# Patient Record
Sex: FEMALE | Race: WHITE | NOT HISPANIC OR LATINO
[De-identification: names, ages, dates, MRNs, and addresses within clinical notes are randomized per-mention and may not be internally consistent; named-entity substitution may affect disease eponyms.]

---

## 2017-04-26 ENCOUNTER — MYAURORA ACCOUNT LINK (OUTPATIENT)
Dept: OTHER | Age: 37
End: 2017-04-26

## 2017-04-26 ENCOUNTER — LAB SERVICES (OUTPATIENT)
Dept: OTHER | Age: 37
End: 2017-04-26

## 2017-04-26 ENCOUNTER — CHARTING TRANS (OUTPATIENT)
Dept: OTHER | Age: 37
End: 2017-04-26

## 2017-05-02 LAB — PAP WITH HIGH RISK HPV: NORMAL

## 2017-06-06 ENCOUNTER — LAB SERVICES (OUTPATIENT)
Dept: OTHER | Age: 37
End: 2017-06-06

## 2017-06-06 ENCOUNTER — MYAURORA ACCOUNT LINK (OUTPATIENT)
Dept: OTHER | Age: 37
End: 2017-06-06

## 2017-06-06 ENCOUNTER — CHARTING TRANS (OUTPATIENT)
Dept: OTHER | Age: 37
End: 2017-06-06

## 2017-06-09 LAB — SURGICAL PATHOLOGY: NORMAL

## 2018-11-03 VITALS
TEMPERATURE: 98.9 F | DIASTOLIC BLOOD PRESSURE: 84 MMHG | HEART RATE: 77 BPM | WEIGHT: 184.13 LBS | RESPIRATION RATE: 18 BRPM | SYSTOLIC BLOOD PRESSURE: 148 MMHG

## 2018-11-04 VITALS
SYSTOLIC BLOOD PRESSURE: 142 MMHG | HEIGHT: 64 IN | DIASTOLIC BLOOD PRESSURE: 97 MMHG | RESPIRATION RATE: 18 BRPM | BODY MASS INDEX: 31.99 KG/M2 | WEIGHT: 187.39 LBS | HEART RATE: 117 BPM | TEMPERATURE: 98.6 F

## 2018-12-14 RX ORDER — NORETHINDRONE ACETATE AND ETHINYL ESTRADIOL, ETHINYL ESTRADIOL AND FERROUS FUMARATE 1MG-10(24)
KIT ORAL
Qty: 28 TABLET | Refills: 0 | Status: SHIPPED | OUTPATIENT
Start: 2018-12-14 | End: 2019-01-25 | Stop reason: SDUPTHER

## 2019-01-25 RX ORDER — NORETHINDRONE ACETATE AND ETHINYL ESTRADIOL, ETHINYL ESTRADIOL AND FERROUS FUMARATE 1MG-10(24)
KIT ORAL
Qty: 28 TABLET | Refills: 0 | OUTPATIENT
Start: 2019-01-25

## 2019-01-29 PROBLEM — R03.0 ELEVATED BP WITHOUT DIAGNOSIS OF HYPERTENSION: Status: ACTIVE | Noted: 2017-04-26

## 2019-01-29 PROBLEM — Z30.41 ORAL CONTRACEPTIVE USE: Status: ACTIVE | Noted: 2017-04-26

## 2019-01-29 PROBLEM — N84.1 CERVICAL POLYP: Status: ACTIVE | Noted: 2017-06-06

## 2019-02-11 ENCOUNTER — OFFICE VISIT (OUTPATIENT)
Dept: INTERNAL MEDICINE | Age: 39
End: 2019-02-11

## 2019-02-11 VITALS
HEIGHT: 64 IN | SYSTOLIC BLOOD PRESSURE: 156 MMHG | BODY MASS INDEX: 34.18 KG/M2 | TEMPERATURE: 98.7 F | HEART RATE: 85 BPM | RESPIRATION RATE: 18 BRPM | DIASTOLIC BLOOD PRESSURE: 96 MMHG | WEIGHT: 200.18 LBS

## 2019-02-11 DIAGNOSIS — R87.610 ATYPICAL SQUAMOUS CELL CHANGES OF UNDETERMINED SIGNIFICANCE (ASCUS) ON CERVICAL CYTOLOGY WITH POSITIVE HIGH RISK HUMAN PAPILLOMA VIRUS (HPV): ICD-10-CM

## 2019-02-11 DIAGNOSIS — R03.0 ELEVATED BP WITHOUT DIAGNOSIS OF HYPERTENSION: ICD-10-CM

## 2019-02-11 DIAGNOSIS — R87.810 ATYPICAL SQUAMOUS CELL CHANGES OF UNDETERMINED SIGNIFICANCE (ASCUS) ON CERVICAL CYTOLOGY WITH POSITIVE HIGH RISK HUMAN PAPILLOMA VIRUS (HPV): ICD-10-CM

## 2019-02-11 DIAGNOSIS — Z01.419 ENCOUNTER FOR GYNECOLOGICAL EXAMINATION WITHOUT ABNORMAL FINDING: Primary | ICD-10-CM

## 2019-02-11 DIAGNOSIS — Z00.00 HEALTHCARE MAINTENANCE: ICD-10-CM

## 2019-02-11 DIAGNOSIS — Z13.220 LIPID SCREENING: ICD-10-CM

## 2019-02-11 DIAGNOSIS — Z30.41 ORAL CONTRACEPTIVE USE: ICD-10-CM

## 2019-02-11 PROCEDURE — 99395 PREV VISIT EST AGE 18-39: CPT | Performed by: FAMILY MEDICINE

## 2019-02-11 ASSESSMENT — PATIENT HEALTH QUESTIONNAIRE - PHQ9
2. FEELING DOWN, DEPRESSED OR HOPELESS: NOT AT ALL
1. LITTLE INTEREST OR PLEASURE IN DOING THINGS: NOT AT ALL
SUM OF ALL RESPONSES TO PHQ9 QUESTIONS 1 AND 2: 0
SUM OF ALL RESPONSES TO PHQ9 QUESTIONS 1 AND 2: 0

## 2019-02-14 LAB — HPV16+18+45 E6+E7MRNA CVX NAA+PROBE: NORMAL

## 2019-06-11 ENCOUNTER — APPOINTMENT (OUTPATIENT)
Dept: INTERNAL MEDICINE | Age: 39
End: 2019-06-11

## 2019-07-18 ENCOUNTER — LAB SERVICES (OUTPATIENT)
Dept: LAB | Age: 39
End: 2019-07-18

## 2019-07-18 DIAGNOSIS — Z00.00 HEALTHCARE MAINTENANCE: ICD-10-CM

## 2019-07-18 DIAGNOSIS — Z13.220 LIPID SCREENING: ICD-10-CM

## 2019-07-18 PROCEDURE — 80061 LIPID PANEL: CPT | Performed by: FAMILY MEDICINE

## 2019-07-18 PROCEDURE — 36415 COLL VENOUS BLD VENIPUNCTURE: CPT

## 2019-07-18 PROCEDURE — 80053 COMPREHEN METABOLIC PANEL: CPT | Performed by: FAMILY MEDICINE

## 2019-07-19 LAB
ALBUMIN SERPL-MCNC: 3.6 G/DL (ref 3.6–5.1)
ALBUMIN/GLOB SERPL: 1 {RATIO} (ref 1–2.4)
ALP SERPL-CCNC: 53 UNITS/L (ref 45–117)
ALT SERPL-CCNC: 33 UNITS/L
ANION GAP SERPL CALC-SCNC: 11 MMOL/L (ref 10–20)
AST SERPL-CCNC: 22 UNITS/L
BILIRUB SERPL-MCNC: 0.4 MG/DL (ref 0.2–1)
BUN SERPL-MCNC: 10 MG/DL (ref 6–20)
BUN/CREAT SERPL: 14 (ref 7–25)
CALCIUM SERPL-MCNC: 9.5 MG/DL (ref 8.4–10.2)
CHLORIDE SERPL-SCNC: 106 MMOL/L (ref 98–107)
CHOLEST SERPL-MCNC: 203 MG/DL
CHOLEST/HDLC SERPL: 2.9 {RATIO}
CO2 SERPL-SCNC: 28 MMOL/L (ref 21–32)
CREAT SERPL-MCNC: 0.71 MG/DL (ref 0.51–0.95)
FASTING STATUS PATIENT QL REPORTED: NORMAL HRS
GLOBULIN SER-MCNC: 3.7 G/DL (ref 2–4)
GLUCOSE SERPL-MCNC: 94 MG/DL (ref 65–99)
HDLC SERPL-MCNC: 71 MG/DL
LDLC SERPL-MCNC: 106 MG/DL
LENGTH OF FAST TIME PATIENT: ABNORMAL HRS
NONHDLC SERPL-MCNC: 132 MG/DL
POTASSIUM SERPL-SCNC: 4.1 MMOL/L (ref 3.4–5.1)
PROT SERPL-MCNC: 7.3 G/DL (ref 6.4–8.2)
SODIUM SERPL-SCNC: 141 MMOL/L (ref 135–145)
TRIGL SERPL-MCNC: 132 MG/DL

## 2019-07-23 ENCOUNTER — OFFICE VISIT (OUTPATIENT)
Dept: INTERNAL MEDICINE | Age: 39
End: 2019-07-23

## 2019-07-23 VITALS
SYSTOLIC BLOOD PRESSURE: 153 MMHG | WEIGHT: 201.94 LBS | BODY MASS INDEX: 34.66 KG/M2 | DIASTOLIC BLOOD PRESSURE: 89 MMHG | TEMPERATURE: 99.9 F | RESPIRATION RATE: 18 BRPM | HEART RATE: 89 BPM

## 2019-07-23 DIAGNOSIS — R03.0 ELEVATED BP WITHOUT DIAGNOSIS OF HYPERTENSION: Primary | ICD-10-CM

## 2019-07-23 DIAGNOSIS — Z30.41 ORAL CONTRACEPTIVE USE: ICD-10-CM

## 2019-07-23 DIAGNOSIS — Z71.2 ENCOUNTER TO DISCUSS TEST RESULTS: ICD-10-CM

## 2019-07-23 DIAGNOSIS — E66.9 OBESITY (BMI 30-39.9): ICD-10-CM

## 2019-07-23 PROCEDURE — 99213 OFFICE O/P EST LOW 20 MIN: CPT | Performed by: FAMILY MEDICINE

## 2019-07-23 ASSESSMENT — ENCOUNTER SYMPTOMS
CONSTITUTIONAL NEGATIVE: 1
NUMBNESS: 0
HEADACHES: 0
WEAKNESS: 0
LIGHT-HEADEDNESS: 0

## 2019-07-23 ASSESSMENT — PATIENT HEALTH QUESTIONNAIRE - PHQ9
SUM OF ALL RESPONSES TO PHQ9 QUESTIONS 1 AND 2: 0
SUM OF ALL RESPONSES TO PHQ9 QUESTIONS 1 AND 2: 0
1. LITTLE INTEREST OR PLEASURE IN DOING THINGS: NOT AT ALL
2. FEELING DOWN, DEPRESSED OR HOPELESS: NOT AT ALL

## 2020-01-22 DIAGNOSIS — Z30.41 ORAL CONTRACEPTIVE USE: ICD-10-CM

## 2020-01-22 RX ORDER — NORETHINDRONE ACETATE AND ETHINYL ESTRADIOL, ETHINYL ESTRADIOL AND FERROUS FUMARATE 1MG-10(24)
KIT ORAL
Qty: 84 TABLET | Refills: 3 | Status: SHIPPED | OUTPATIENT
Start: 2020-01-22 | End: 2021-01-03

## 2021-01-02 DIAGNOSIS — Z30.41 ORAL CONTRACEPTIVE USE: ICD-10-CM

## 2021-01-03 RX ORDER — NORETHINDRONE ACETATE AND ETHINYL ESTRADIOL, ETHINYL ESTRADIOL AND FERROUS FUMARATE 1MG-10(24)
KIT ORAL
Qty: 84 TABLET | Refills: 3 | Status: SHIPPED | OUTPATIENT
Start: 2021-01-03 | End: 2021-12-02

## 2021-06-22 ENCOUNTER — LAB SERVICES (OUTPATIENT)
Dept: LAB | Age: 41
End: 2021-06-22

## 2021-06-22 ENCOUNTER — OFFICE VISIT (OUTPATIENT)
Dept: INTERNAL MEDICINE | Age: 41
End: 2021-06-22

## 2021-06-22 VITALS
WEIGHT: 200 LBS | BODY MASS INDEX: 34.15 KG/M2 | HEIGHT: 64 IN | TEMPERATURE: 97.7 F | SYSTOLIC BLOOD PRESSURE: 128 MMHG | HEART RATE: 67 BPM | DIASTOLIC BLOOD PRESSURE: 77 MMHG | RESPIRATION RATE: 18 BRPM

## 2021-06-22 DIAGNOSIS — Z11.59 NEED FOR HEPATITIS C SCREENING TEST: ICD-10-CM

## 2021-06-22 DIAGNOSIS — R03.0 ELEVATED BP WITHOUT DIAGNOSIS OF HYPERTENSION: ICD-10-CM

## 2021-06-22 DIAGNOSIS — R92.2 DENSE BREASTS: ICD-10-CM

## 2021-06-22 DIAGNOSIS — Z00.00 HEALTHCARE MAINTENANCE: Primary | ICD-10-CM

## 2021-06-22 DIAGNOSIS — Z00.00 HEALTHCARE MAINTENANCE: ICD-10-CM

## 2021-06-22 DIAGNOSIS — Z12.31 ENCOUNTER FOR SCREENING MAMMOGRAM FOR MALIGNANT NEOPLASM OF BREAST: ICD-10-CM

## 2021-06-22 DIAGNOSIS — R92.30 DENSE BREASTS: ICD-10-CM

## 2021-06-22 LAB
ALBUMIN SERPL-MCNC: 3.7 G/DL (ref 3.6–5.1)
ALBUMIN/GLOB SERPL: 0.9 {RATIO} (ref 1–2.4)
ALP SERPL-CCNC: 55 UNITS/L (ref 45–117)
ALT SERPL-CCNC: 31 UNITS/L
ANION GAP SERPL CALC-SCNC: 12 MMOL/L (ref 10–20)
AST SERPL-CCNC: 28 UNITS/L
BILIRUB SERPL-MCNC: 0.5 MG/DL (ref 0.2–1)
BUN SERPL-MCNC: 9 MG/DL (ref 6–20)
BUN/CREAT SERPL: 12 (ref 7–25)
CALCIUM SERPL-MCNC: 9.3 MG/DL (ref 8.4–10.2)
CHLORIDE SERPL-SCNC: 106 MMOL/L (ref 98–107)
CHOLEST SERPL-MCNC: 206 MG/DL
CHOLEST/HDLC SERPL: 3.3 {RATIO}
CO2 SERPL-SCNC: 25 MMOL/L (ref 21–32)
CREAT SERPL-MCNC: 0.74 MG/DL (ref 0.51–0.95)
FASTING DURATION TIME PATIENT: 11 HOURS
FASTING DURATION TIME PATIENT: ABNORMAL H
GFR SERPLBLD BASED ON 1.73 SQ M-ARVRAT: >90 ML/MIN/1.73M2
GLOBULIN SER-MCNC: 3.9 G/DL (ref 2–4)
GLUCOSE SERPL-MCNC: 105 MG/DL (ref 65–99)
HCV AB SER QL: NEGATIVE
HDLC SERPL-MCNC: 63 MG/DL
LDLC SERPL CALC-MCNC: 106 MG/DL
NONHDLC SERPL-MCNC: 143 MG/DL
POTASSIUM SERPL-SCNC: 4.2 MMOL/L (ref 3.4–5.1)
PROT SERPL-MCNC: 7.6 G/DL (ref 6.4–8.2)
SODIUM SERPL-SCNC: 139 MMOL/L (ref 135–145)
TRIGL SERPL-MCNC: 185 MG/DL

## 2021-06-22 PROCEDURE — 80061 LIPID PANEL: CPT | Performed by: FAMILY MEDICINE

## 2021-06-22 PROCEDURE — 86803 HEPATITIS C AB TEST: CPT | Performed by: FAMILY MEDICINE

## 2021-06-22 PROCEDURE — 80053 COMPREHEN METABOLIC PANEL: CPT | Performed by: FAMILY MEDICINE

## 2021-06-22 PROCEDURE — 99396 PREV VISIT EST AGE 40-64: CPT | Performed by: FAMILY MEDICINE

## 2021-06-22 ASSESSMENT — ENCOUNTER SYMPTOMS
GASTROINTESTINAL NEGATIVE: 1
COUGH: 0
DIZZINESS: 0
NUMBNESS: 0
CONSTITUTIONAL NEGATIVE: 1
SHORTNESS OF BREATH: 0
SLEEP DISTURBANCE: 0
WEAKNESS: 0
CHEST TIGHTNESS: 0
LIGHT-HEADEDNESS: 0
HEADACHES: 0

## 2021-06-22 ASSESSMENT — PATIENT HEALTH QUESTIONNAIRE - PHQ9
CLINICAL INTERPRETATION OF PHQ9 SCORE: NO FURTHER SCREENING NEEDED
1. LITTLE INTEREST OR PLEASURE IN DOING THINGS: NOT AT ALL
SUM OF ALL RESPONSES TO PHQ9 QUESTIONS 1 AND 2: 0
SUM OF ALL RESPONSES TO PHQ9 QUESTIONS 1 AND 2: 0
CLINICAL INTERPRETATION OF PHQ2 SCORE: NO FURTHER SCREENING NEEDED
2. FEELING DOWN, DEPRESSED OR HOPELESS: NOT AT ALL

## 2021-06-22 ASSESSMENT — PAIN SCALES - GENERAL: PAINLEVEL: 0

## 2021-07-09 ENCOUNTER — HOSPITAL ENCOUNTER (OUTPATIENT)
Dept: MAMMOGRAPHY | Age: 41
Discharge: HOME | End: 2021-07-09
Attending: FAMILY MEDICINE

## 2021-07-09 DIAGNOSIS — R92.2 DENSE BREASTS: ICD-10-CM

## 2021-07-09 DIAGNOSIS — Z12.31 ENCOUNTER FOR SCREENING MAMMOGRAM FOR MALIGNANT NEOPLASM OF BREAST: ICD-10-CM

## 2021-07-09 DIAGNOSIS — R92.30 DENSE BREASTS: ICD-10-CM

## 2021-07-09 PROCEDURE — 77063 BREAST TOMOSYNTHESIS BI: CPT

## 2021-07-09 PROCEDURE — 76641 ULTRASOUND BREAST COMPLETE: CPT

## 2021-09-14 ENCOUNTER — E-ADVICE (OUTPATIENT)
Dept: INTERNAL MEDICINE | Age: 41
End: 2021-09-14

## 2021-12-02 DIAGNOSIS — Z30.41 ORAL CONTRACEPTIVE USE: ICD-10-CM

## 2021-12-02 RX ORDER — NORETHINDRONE ACETATE AND ETHINYL ESTRADIOL, ETHINYL ESTRADIOL AND FERROUS FUMARATE 1MG-10(24)
KIT ORAL
Qty: 84 TABLET | Refills: 3 | Status: SHIPPED | OUTPATIENT
Start: 2021-12-02 | End: 2022-09-01 | Stop reason: ALTCHOICE

## 2022-06-06 ENCOUNTER — APPOINTMENT (OUTPATIENT)
Age: 42
Setting detail: DERMATOLOGY
End: 2022-06-08

## 2022-06-06 ENCOUNTER — EXTERNAL RECORD (OUTPATIENT)
Dept: HEALTH INFORMATION MANAGEMENT | Facility: OTHER | Age: 42
End: 2022-06-06

## 2022-06-06 DIAGNOSIS — L81.4 OTHER MELANIN HYPERPIGMENTATION: ICD-10-CM

## 2022-06-06 DIAGNOSIS — D18.0 HEMANGIOMA: ICD-10-CM

## 2022-06-06 DIAGNOSIS — F42.4 EXCORIATION (SKIN-PICKING) DISORDER: ICD-10-CM

## 2022-06-06 DIAGNOSIS — Z12.83 ENCOUNTER FOR SCREENING FOR MALIGNANT NEOPLASM OF SKIN: ICD-10-CM

## 2022-06-06 DIAGNOSIS — D22 MELANOCYTIC NEVI: ICD-10-CM

## 2022-06-06 PROBLEM — D18.01 HEMANGIOMA OF SKIN AND SUBCUTANEOUS TISSUE: Status: ACTIVE | Noted: 2022-06-06

## 2022-06-06 PROBLEM — S20.102A UNSPECIFIED SUPERFICIAL INJURIES OF BREAST, LEFT BREAST, INITIAL ENCOUNTER: Status: ACTIVE | Noted: 2022-06-06

## 2022-06-06 PROBLEM — D22.5 MELANOCYTIC NEVI OF TRUNK: Status: ACTIVE | Noted: 2022-06-06

## 2022-06-06 PROBLEM — D23.72 OTHER BENIGN NEOPLASM OF SKIN OF LEFT LOWER LIMB, INCLUDING HIP: Status: ACTIVE | Noted: 2022-06-06

## 2022-06-06 PROCEDURE — OTHER COUNSELING: OTHER

## 2022-06-06 PROCEDURE — 99203 OFFICE O/P NEW LOW 30 MIN: CPT

## 2022-06-06 PROCEDURE — OTHER ADDITIONAL NOTES: OTHER

## 2022-06-06 PROCEDURE — OTHER EDUCATIONAL RESOURCES PROVIDED: OTHER

## 2022-06-06 ASSESSMENT — LOCATION DETAILED DESCRIPTION DERM
LOCATION DETAILED: LEFT SUPERIOR LATERAL UPPER BACK
LOCATION DETAILED: RIGHT ANTERIOR SHOULDER
LOCATION DETAILED: MIDDLE STERNUM
LOCATION DETAILED: SUPERIOR THORACIC SPINE
LOCATION DETAILED: EPIGASTRIC SKIN
LOCATION DETAILED: LEFT LATERAL SUPERIOR CHEST
LOCATION DETAILED: LEFT LATERAL BREAST 1-2:00 REGION
LOCATION DETAILED: RIGHT SUPERIOR LATERAL UPPER BACK
LOCATION DETAILED: INFERIOR THORACIC SPINE

## 2022-06-06 ASSESSMENT — LOCATION SIMPLE DESCRIPTION DERM
LOCATION SIMPLE: RIGHT SHOULDER
LOCATION SIMPLE: RIGHT BACK
LOCATION SIMPLE: LEFT BREAST
LOCATION SIMPLE: UPPER BACK
LOCATION SIMPLE: ABDOMEN
LOCATION SIMPLE: CHEST
LOCATION SIMPLE: LEFT UPPER BACK

## 2022-06-06 ASSESSMENT — LOCATION ZONE DERM
LOCATION ZONE: ARM
LOCATION ZONE: TRUNK

## 2022-06-06 NOTE — PROCEDURE: ADDITIONAL NOTES
Additional Notes: Patient was counseled on electrodessication cosmetic treatment, charge $200 for 15+ for a procedural appointment.
Detail Level: Simple
Render Risk Assessment In Note?: no
Additional Notes: Patient was counseled on keeping these covered with hydrocolloid bandages to prevent picking.\\nPatient was counseled if flaring persists after one month, would need to followup for re-evaluation and potential biopsy.

## 2022-07-11 ENCOUNTER — E-ADVICE (OUTPATIENT)
Dept: INTERNAL MEDICINE | Age: 42
End: 2022-07-11

## 2022-07-11 DIAGNOSIS — Z12.31 ENCOUNTER FOR SCREENING MAMMOGRAM FOR MALIGNANT NEOPLASM OF BREAST: Primary | ICD-10-CM

## 2022-07-27 ENCOUNTER — HOSPITAL ENCOUNTER (OUTPATIENT)
Dept: MAMMOGRAPHY | Age: 42
Discharge: HOME | End: 2022-07-27
Attending: FAMILY MEDICINE

## 2022-07-27 DIAGNOSIS — Z12.31 ENCOUNTER FOR SCREENING MAMMOGRAM FOR MALIGNANT NEOPLASM OF BREAST: ICD-10-CM

## 2022-07-27 PROCEDURE — 77063 BREAST TOMOSYNTHESIS BI: CPT

## 2022-09-01 ENCOUNTER — LAB SERVICES (OUTPATIENT)
Dept: LAB | Age: 42
End: 2022-09-01

## 2022-09-01 ENCOUNTER — OFFICE VISIT (OUTPATIENT)
Dept: INTERNAL MEDICINE | Age: 42
End: 2022-09-01

## 2022-09-01 VITALS
BODY MASS INDEX: 35.53 KG/M2 | HEIGHT: 64 IN | SYSTOLIC BLOOD PRESSURE: 152 MMHG | OXYGEN SATURATION: 98 % | DIASTOLIC BLOOD PRESSURE: 89 MMHG | RESPIRATION RATE: 16 BRPM | HEART RATE: 72 BPM | TEMPERATURE: 98.1 F | WEIGHT: 208.1 LBS

## 2022-09-01 DIAGNOSIS — E66.9 OBESITY (BMI 30-39.9): ICD-10-CM

## 2022-09-01 DIAGNOSIS — Z00.00 HEALTHCARE MAINTENANCE: Primary | ICD-10-CM

## 2022-09-01 DIAGNOSIS — Z71.3 ENCOUNTER FOR WEIGHT LOSS COUNSELING: ICD-10-CM

## 2022-09-01 DIAGNOSIS — R03.0 ELEVATED BP WITHOUT DIAGNOSIS OF HYPERTENSION: ICD-10-CM

## 2022-09-01 LAB
ALBUMIN SERPL-MCNC: 3.6 G/DL (ref 3.6–5.1)
ALBUMIN/GLOB SERPL: 0.9 {RATIO} (ref 1–2.4)
ALP SERPL-CCNC: 75 UNITS/L (ref 45–117)
ALT SERPL-CCNC: 25 UNITS/L
ANION GAP SERPL CALC-SCNC: 12 MMOL/L (ref 7–19)
AST SERPL-CCNC: 17 UNITS/L
BILIRUB SERPL-MCNC: 0.5 MG/DL (ref 0.2–1)
BUN SERPL-MCNC: 6 MG/DL (ref 6–20)
BUN/CREAT SERPL: 9 (ref 7–25)
CALCIUM SERPL-MCNC: 9 MG/DL (ref 8.4–10.2)
CHLORIDE SERPL-SCNC: 105 MMOL/L (ref 97–110)
CHOLEST SERPL-MCNC: 182 MG/DL
CHOLEST/HDLC SERPL: 3.1 {RATIO}
CO2 SERPL-SCNC: 27 MMOL/L (ref 21–32)
CREAT SERPL-MCNC: 0.68 MG/DL (ref 0.51–0.95)
FASTING DURATION TIME PATIENT: ABNORMAL H
FASTING DURATION TIME PATIENT: NORMAL H
GFR SERPLBLD BASED ON 1.73 SQ M-ARVRAT: >90 ML/MIN
GLOBULIN SER-MCNC: 3.8 G/DL (ref 2–4)
GLUCOSE SERPL-MCNC: 97 MG/DL (ref 70–99)
HDLC SERPL-MCNC: 59 MG/DL
LDLC SERPL CALC-MCNC: 105 MG/DL
NONHDLC SERPL-MCNC: 123 MG/DL
POTASSIUM SERPL-SCNC: 4.3 MMOL/L (ref 3.4–5.1)
PROT SERPL-MCNC: 7.4 G/DL (ref 6.4–8.2)
SODIUM SERPL-SCNC: 140 MMOL/L (ref 135–145)
TRIGL SERPL-MCNC: 91 MG/DL
TSH SERPL-ACNC: 1.15 MCUNITS/ML (ref 0.35–5)

## 2022-09-01 PROCEDURE — 99396 PREV VISIT EST AGE 40-64: CPT | Performed by: FAMILY MEDICINE

## 2022-09-01 PROCEDURE — 80053 COMPREHEN METABOLIC PANEL: CPT | Performed by: INTERNAL MEDICINE

## 2022-09-01 PROCEDURE — 84443 ASSAY THYROID STIM HORMONE: CPT | Performed by: INTERNAL MEDICINE

## 2022-09-01 PROCEDURE — 80061 LIPID PANEL: CPT | Performed by: INTERNAL MEDICINE

## 2022-09-01 RX ORDER — COVID-19 MOLECULAR TEST ASSAY
KIT MISCELLANEOUS
COMMUNITY
Start: 2022-06-12 | End: 2023-07-27 | Stop reason: ALTCHOICE

## 2022-09-01 ASSESSMENT — PATIENT HEALTH QUESTIONNAIRE - PHQ9
2. FEELING DOWN, DEPRESSED OR HOPELESS: NOT AT ALL
SUM OF ALL RESPONSES TO PHQ9 QUESTIONS 1 AND 2: 0
CLINICAL INTERPRETATION OF PHQ2 SCORE: NO FURTHER SCREENING NEEDED
SUM OF ALL RESPONSES TO PHQ9 QUESTIONS 1 AND 2: 0
1. LITTLE INTEREST OR PLEASURE IN DOING THINGS: NOT AT ALL

## 2022-09-01 ASSESSMENT — ENCOUNTER SYMPTOMS
COUGH: 0
WEAKNESS: 0
SHORTNESS OF BREATH: 0
CHEST TIGHTNESS: 0
CONSTITUTIONAL NEGATIVE: 1
DIZZINESS: 0
HEADACHES: 0
LIGHT-HEADEDNESS: 0
NUMBNESS: 0
GASTROINTESTINAL NEGATIVE: 1
SLEEP DISTURBANCE: 0

## 2022-09-01 ASSESSMENT — PAIN SCALES - GENERAL: PAINLEVEL: 0

## 2022-10-11 ENCOUNTER — OFFICE VISIT (OUTPATIENT)
Dept: NUTRITION | Age: 42
End: 2022-10-11

## 2022-10-11 DIAGNOSIS — E66.9 OBESITY (BMI 30-39.9): Primary | ICD-10-CM

## 2022-10-11 PROCEDURE — 97802 MEDICAL NUTRITION INDIV IN: CPT

## 2022-10-12 DIAGNOSIS — E66.9 OBESITY (BMI 30-39.9): Primary | ICD-10-CM

## 2022-10-12 DIAGNOSIS — Z71.3 DIETARY COUNSELING: ICD-10-CM

## 2022-11-04 DIAGNOSIS — Z30.41 ORAL CONTRACEPTIVE USE: ICD-10-CM

## 2022-11-07 RX ORDER — NORETHINDRONE ACETATE AND ETHINYL ESTRADIOL, ETHINYL ESTRADIOL AND FERROUS FUMARATE 1MG-10(24)
KIT ORAL
Qty: 84 TABLET | Refills: 3 | OUTPATIENT
Start: 2022-11-07

## 2022-12-05 ENCOUNTER — EXTERNAL LAB (OUTPATIENT)
Dept: OTHER | Age: 42
End: 2022-12-05

## 2022-12-05 LAB
ABO + RH BLD: NORMAL
BLD GP AB SCN SERPL QL: NEGATIVE
C TRACH RRNA SPEC QL NAA+PROBE: NEGATIVE
HBV SURFACE AG SER QL: NEGATIVE
HCV AB SER QL: <0.1 S/CO RATIO (ref 0–0.9)
HIV 1+2 AB+HIV1 P24 AG SERPL QL IA: NON REACTIVE
LAB RESULT: NORMAL
N GONORRHOEA RRNA SPEC QL NAA+PROBE: NEGATIVE
RPR SER QL: NON REACTIVE
RUBV IGG SERPL IA-ACNC: 1.68 INDEX

## 2022-12-13 ENCOUNTER — APPOINTMENT (OUTPATIENT)
Dept: NUTRITION | Age: 42
End: 2022-12-13

## 2022-12-22 ENCOUNTER — OFFICE VISIT (OUTPATIENT)
Dept: NUTRITION | Age: 42
End: 2022-12-22
Attending: FAMILY MEDICINE

## 2022-12-22 DIAGNOSIS — E66.9 OBESITY (BMI 35.0-39.9 WITHOUT COMORBIDITY): Primary | ICD-10-CM

## 2022-12-22 PROCEDURE — 97803 MED NUTRITION INDIV SUBSEQ: CPT

## 2023-02-09 ENCOUNTER — APPOINTMENT (OUTPATIENT)
Dept: NUTRITION | Age: 43
End: 2023-02-09

## 2023-04-29 ENCOUNTER — EXTERNAL LAB (OUTPATIENT)
Dept: OTHER | Age: 43
End: 2023-04-29

## 2023-04-29 LAB
25(OH)D3+25(OH)D2 SERPL-MCNC: 37.3 NG/ML (ref 30–100)
ABSOLUTE IMMATURE GRANULOCYTES (OFFPRE24): 0 X10E3/UL (ref 0–0.1)
BASOPHILS # BLD: 0.1 X10E3/UL (ref 0–0.2)
BASOPHILS NFR BLD: 1 %
EOSINOPHIL # BLD: 0.1 X10E3/UL (ref 0–0.4)
EOSINOPHIL NFR BLD: 2 %
ERYTHROCYTE [DISTWIDTH] IN BLOOD: 12.8 % (ref 11.7–15.4)
HCT VFR BLD CALC: 40.4 % (ref 34–46.6)
HGB BLD-MCNC: 13.2 G/DL (ref 11.1–15.9)
IMMATURE GRANULOCYTES (OFFPRE25): 0 %
LYMPHOCYTES # BLD: 1.8 X10E3/UL (ref 0.7–3.1)
LYMPHOCYTES NFR BLD: 22 %
MCH RBC QN AUTO: 29.9 PG (ref 26.6–33)
MCHC RBC AUTO-ENTMCNC: 32.7 G/DL (ref 31.5–35.7)
MCV RBC AUTO: 91 FL (ref 79–97)
MONOCYTES # BLD: 0.8 X10E3/UL (ref 0.1–0.9)
MONOCYTES NFR BLD: 10 %
NEUTROPHILS # BLD: 5.3 X10E3/UL (ref 1.4–7)
NEUTROPHILS NFR BLD: 65 %
PLATELET # BLD: 202 X10E3/UL (ref 150–450)
RBC # BLD: 4.42 X10E6/UL (ref 3.77–5.28)
T4 FREE SERPL-MCNC: 1.16 NG/DL (ref 0.82–1.77)
TSH SERPL-ACNC: 1.86 UIU/ML (ref 0.45–4.5)
WBC # BLD: 8.1 X10E3/UL (ref 3.4–10.8)

## 2023-06-05 ENCOUNTER — EXTERNAL LAB (OUTPATIENT)
Dept: OTHER | Age: 43
End: 2023-06-05

## 2023-06-05 LAB — LAB RESULT: NORMAL

## 2023-06-14 ENCOUNTER — EXTERNAL RECORD (OUTPATIENT)
Dept: HEALTH INFORMATION MANAGEMENT | Facility: OTHER | Age: 43
End: 2023-06-14

## 2023-06-15 ENCOUNTER — EXTERNAL RECORD (OUTPATIENT)
Dept: HEALTH INFORMATION MANAGEMENT | Facility: OTHER | Age: 43
End: 2023-06-15

## 2023-06-20 ENCOUNTER — TELEPHONE (OUTPATIENT)
Dept: OBGYN | Age: 43
End: 2023-06-20

## 2023-06-20 ENCOUNTER — APPOINTMENT (OUTPATIENT)
Dept: URBAN - METROPOLITAN AREA CLINIC 314 | Age: 43
Setting detail: DERMATOLOGY
End: 2023-07-06

## 2023-06-20 DIAGNOSIS — D18.0 HEMANGIOMA: ICD-10-CM

## 2023-06-20 DIAGNOSIS — L81.4 OTHER MELANIN HYPERPIGMENTATION: ICD-10-CM

## 2023-06-20 DIAGNOSIS — Z12.83 ENCOUNTER FOR SCREENING FOR MALIGNANT NEOPLASM OF SKIN: ICD-10-CM

## 2023-06-20 DIAGNOSIS — D22 MELANOCYTIC NEVI: ICD-10-CM

## 2023-06-20 PROBLEM — D22.5 MELANOCYTIC NEVI OF TRUNK: Status: ACTIVE | Noted: 2023-06-20

## 2023-06-20 PROBLEM — D23.72 OTHER BENIGN NEOPLASM OF SKIN OF LEFT LOWER LIMB, INCLUDING HIP: Status: ACTIVE | Noted: 2023-06-20

## 2023-06-20 PROBLEM — D18.01 HEMANGIOMA OF SKIN AND SUBCUTANEOUS TISSUE: Status: ACTIVE | Noted: 2023-06-20

## 2023-06-20 PROCEDURE — 99213 OFFICE O/P EST LOW 20 MIN: CPT

## 2023-06-20 PROCEDURE — OTHER ADDITIONAL NOTES: OTHER

## 2023-06-20 PROCEDURE — OTHER COUNSELING: OTHER

## 2023-06-20 ASSESSMENT — LOCATION DETAILED DESCRIPTION DERM
LOCATION DETAILED: EPIGASTRIC SKIN
LOCATION DETAILED: RIGHT ANTERIOR SHOULDER
LOCATION DETAILED: SUPERIOR THORACIC SPINE
LOCATION DETAILED: LEFT LATERAL SUPERIOR CHEST
LOCATION DETAILED: LEFT SUPERIOR LATERAL UPPER BACK
LOCATION DETAILED: INFERIOR THORACIC SPINE
LOCATION DETAILED: RIGHT SUPERIOR LATERAL UPPER BACK
LOCATION DETAILED: MIDDLE STERNUM

## 2023-06-20 ASSESSMENT — LOCATION SIMPLE DESCRIPTION DERM
LOCATION SIMPLE: LEFT UPPER BACK
LOCATION SIMPLE: RIGHT BACK
LOCATION SIMPLE: RIGHT SHOULDER
LOCATION SIMPLE: ABDOMEN
LOCATION SIMPLE: CHEST
LOCATION SIMPLE: UPPER BACK

## 2023-06-20 ASSESSMENT — LOCATION ZONE DERM
LOCATION ZONE: TRUNK
LOCATION ZONE: ARM

## 2023-06-20 NOTE — PROCEDURE: ADDITIONAL NOTES
Detail Level: Simple
Render Risk Assessment In Note?: no
Additional Notes: Patient was counseled on electrodessication cosmetic treatment, charge $200 for 15+ for a procedural appointment.

## 2023-06-22 ENCOUNTER — TELEPHONE (OUTPATIENT)
Dept: OBGYN | Age: 43
End: 2023-06-22

## 2023-06-22 ENCOUNTER — TELEPHONIC VISIT (OUTPATIENT)
Dept: OBGYN | Age: 43
End: 2023-06-22

## 2023-06-22 DIAGNOSIS — Z34.90 ENCOUNTER FOR SUPERVISION OF NORMAL PREGNANCY, ANTEPARTUM, UNSPECIFIED GRAVIDITY: Primary | ICD-10-CM

## 2023-06-22 PROCEDURE — X1094 NO CHARGE VISIT: HCPCS | Performed by: OBSTETRICS & GYNECOLOGY

## 2023-06-23 ENCOUNTER — APPOINTMENT (OUTPATIENT)
Dept: OBGYN | Age: 43
End: 2023-06-23

## 2023-06-29 ENCOUNTER — HOSPITAL ENCOUNTER (OUTPATIENT)
Dept: LAB | Age: 43
Discharge: HOME | End: 2023-06-29

## 2023-06-29 ENCOUNTER — FIRST OB VISIT (OUTPATIENT)
Dept: OBGYN | Age: 43
End: 2023-06-29

## 2023-06-29 VITALS
BODY MASS INDEX: 34.66 KG/M2 | DIASTOLIC BLOOD PRESSURE: 83 MMHG | WEIGHT: 203 LBS | TEMPERATURE: 100 F | SYSTOLIC BLOOD PRESSURE: 135 MMHG | RESPIRATION RATE: 16 BRPM | HEART RATE: 87 BPM | HEIGHT: 64 IN

## 2023-06-29 DIAGNOSIS — Z01.419 GYNECOLOGIC EXAM NORMAL: ICD-10-CM

## 2023-06-29 DIAGNOSIS — O09.511 PRIMIGRAVIDA OF ADVANCED MATERNAL AGE IN FIRST TRIMESTER: ICD-10-CM

## 2023-06-29 DIAGNOSIS — Z34.90 ENCOUNTER FOR SUPERVISION OF NORMAL PREGNANCY, ANTEPARTUM, UNSPECIFIED GRAVIDITY: ICD-10-CM

## 2023-06-29 DIAGNOSIS — Z32.01 POSITIVE PREGNANCY TEST: Primary | ICD-10-CM

## 2023-06-29 LAB
B-HCG UR QL: POSITIVE
BASOPHILS # BLD: 0.1 K/MCL (ref 0–0.3)
BASOPHILS NFR BLD: 1 %
DEPRECATED RDW RBC: 44.6 FL (ref 39–50)
EOSINOPHIL # BLD: 0.1 K/MCL (ref 0–0.5)
EOSINOPHIL NFR BLD: 1 %
ERYTHROCYTE [DISTWIDTH] IN BLOOD: 13.6 % (ref 11–15)
HCT VFR BLD CALC: 39 % (ref 36–46.5)
HGB BLD-MCNC: 12.9 G/DL (ref 12–15.5)
IMM GRANULOCYTES # BLD AUTO: 0 K/MCL (ref 0–0.2)
IMM GRANULOCYTES # BLD: 0 %
INTERNAL PROCEDURAL CONTROLS ACCEPTABLE: YES
LYMPHOCYTES # BLD: 2.6 K/MCL (ref 1–4.8)
LYMPHOCYTES NFR BLD: 21 %
MCH RBC QN AUTO: 30 PG (ref 26–34)
MCHC RBC AUTO-ENTMCNC: 33.1 G/DL (ref 32–36.5)
MCV RBC AUTO: 90.7 FL (ref 78–100)
MONOCYTES # BLD: 0.7 K/MCL (ref 0.3–0.9)
MONOCYTES NFR BLD: 5 %
NEUTROPHILS # BLD: 9.1 K/MCL (ref 1.8–7.7)
NEUTROPHILS NFR BLD: 72 %
NRBC BLD MANUAL-RTO: 0 /100 WBC
PLATELET # BLD AUTO: 229 K/MCL (ref 140–450)
RBC # BLD: 4.3 MIL/MCL (ref 4–5.2)
TEST LOT EXPIRATION DATE: ABNORMAL
TEST LOT NUMBER: ABNORMAL
WBC # BLD: 12.4 K/MCL (ref 4.2–11)

## 2023-06-29 PROCEDURE — 88175 CYTOPATH C/V AUTO FLUID REDO: CPT | Performed by: INTERNAL MEDICINE

## 2023-06-29 PROCEDURE — 86803 HEPATITIS C AB TEST: CPT | Performed by: OBSTETRICS & GYNECOLOGY

## 2023-06-29 PROCEDURE — 87389 HIV-1 AG W/HIV-1&-2 AB AG IA: CPT | Performed by: OBSTETRICS & GYNECOLOGY

## 2023-06-29 PROCEDURE — 86762 RUBELLA ANTIBODY: CPT | Performed by: OBSTETRICS & GYNECOLOGY

## 2023-06-29 PROCEDURE — 36415 COLL VENOUS BLD VENIPUNCTURE: CPT | Performed by: OBSTETRICS & GYNECOLOGY

## 2023-06-29 PROCEDURE — 81025 URINE PREGNANCY TEST: CPT | Performed by: OBSTETRICS & GYNECOLOGY

## 2023-06-29 PROCEDURE — 86850 RBC ANTIBODY SCREEN: CPT | Performed by: OBSTETRICS & GYNECOLOGY

## 2023-06-29 PROCEDURE — 85025 COMPLETE CBC W/AUTO DIFF WBC: CPT | Performed by: OBSTETRICS & GYNECOLOGY

## 2023-06-29 PROCEDURE — 87624 HPV HI-RISK TYP POOLED RSLT: CPT | Performed by: INTERNAL MEDICINE

## 2023-06-29 PROCEDURE — 86787 VARICELLA-ZOSTER ANTIBODY: CPT | Performed by: OBSTETRICS & GYNECOLOGY

## 2023-06-29 PROCEDURE — 76815 OB US LIMITED FETUS(S): CPT | Performed by: OBSTETRICS & GYNECOLOGY

## 2023-06-29 PROCEDURE — 86592 SYPHILIS TEST NON-TREP QUAL: CPT | Performed by: OBSTETRICS & GYNECOLOGY

## 2023-06-29 PROCEDURE — 87340 HEPATITIS B SURFACE AG IA: CPT | Performed by: OBSTETRICS & GYNECOLOGY

## 2023-06-29 PROCEDURE — 0500F INITIAL PRENATAL CARE VISIT: CPT | Performed by: OBSTETRICS & GYNECOLOGY

## 2023-06-29 PROCEDURE — 87086 URINE CULTURE/COLONY COUNT: CPT | Performed by: INTERNAL MEDICINE

## 2023-06-29 PROCEDURE — 87491 CHLMYD TRACH DNA AMP PROBE: CPT | Performed by: INTERNAL MEDICINE

## 2023-06-29 PROCEDURE — 87591 N.GONORRHOEAE DNA AMP PROB: CPT | Performed by: INTERNAL MEDICINE

## 2023-06-29 ASSESSMENT — EDINBURGH POSTNATAL DEPRESSION SCALE (EPDS)
TOTAL SCORE: 4
THINGS HAVE BEEN GETTING ON TOP OF ME: NO, MOST OF THE TIME I HAVE COPED QUITE WELL
I HAVE BLAMED MYSELF UNNECESSARILY WHEN THINGS WENT WRONG: NOT VERY OFTEN
I HAVE BEEN ANXIOUS OR WORRIED FOR NO GOOD REASON: YES, SOMETIMES
I HAVE BEEN ABLE TO LAUGH AND SEE THE FUNNY SIDE OF THINGS: AS MUCH AS I ALWAYS COULD
I HAVE BEEN SO UNHAPPY THAT I HAVE BEEN CRYING: NO, NEVER
THE THOUGHT OF HARMING MYSELF HAS OCCURRED TO ME: NEVER
I HAVE BEEN SO UNHAPPY THAT I HAVE HAD DIFFICULTY SLEEPING: NOT AT ALL
I HAVE LOOKED FORWARD WITH ENJOYMENT TO THINGS: AS MUCH AS I EVER DID
I HAVE FELT SCARED OR PANICKY FOR NO GOOD REASON: NO, NOT AT ALL
I HAVE FELT SAD OR MISERABLE: NO, NOT AT ALL

## 2023-06-29 ASSESSMENT — LIFESTYLE VARIABLES
BEFORE YOU WERE PREGNANT DID YOU HAVE A PROBLEM WITH ALCOHOL OR DRUG USE? (PAST): NO
DO ANY OF YOUR FRIENDS (PEERS) HAVE PROBLEMS WITH ALCOHOL OR DRUG USE?: NO
DOES YOUR PARTNER HAVE A PROBLEM WITH ALCOHOL OR DRUG USE?: NO
IN THE PAST MONTH, DID YOU DRINK BEER, WINE OR LIQUOR, OR USE OTHER DRUGS? (PREGNANCY): NO
DID ANY OF YOUR PARENTS HAVE PROBLEMS WITH ALCOHOL OR DRUG USE?: NO

## 2023-06-30 LAB
ABO + RH BLD: NORMAL
APPEARANCE UR: CLEAR
BACTERIA UR CULT: NORMAL
BILIRUB UR QL STRIP: NEGATIVE
BLD GP AB SCN SERPL QL GEL: NEGATIVE
C TRACH RRNA UR QL NAA+PROBE: NEGATIVE
COLOR UR: NORMAL
GLUCOSE UR STRIP-MCNC: NEGATIVE MG/DL
HBV SURFACE AG SER QL: NEGATIVE
HCV AB SER QL: NEGATIVE
HGB UR QL STRIP: NEGATIVE
HIV 1+2 AB+HIV1 P24 AG SERPL QL IA: NONREACTIVE
KETONES UR STRIP-MCNC: NEGATIVE MG/DL
LEUKOCYTE ESTERASE UR QL STRIP: NEGATIVE
Lab: NORMAL
N GONORRHOEA RRNA UR QL NAA+PROBE: NEGATIVE
NITRITE UR QL STRIP: NEGATIVE
PH UR STRIP: 6 [PH] (ref 5–7)
PROT UR STRIP-MCNC: NEGATIVE MG/DL
RPR SER QL: NONREACTIVE
RUBV IGG SERPL IA-ACNC: 62.9 UNITS/ML
SP GR UR STRIP: 1.01 (ref 1–1.03)
UROBILINOGEN UR STRIP-MCNC: 0.2 MG/DL
VZV IGG SER IA-ACNC: 2754 INDEX
VZV IGG SER QL IA: NORMAL

## 2023-07-05 LAB
CASE RPRT: NORMAL
CLINICAL INFO: NORMAL
CYTOLOGY CVX/VAG STUDY: NORMAL
HPV16+18+45 E6+E7MRNA CVX NAA+PROBE: NEGATIVE
Lab: NORMAL
PAP EDUCATIONAL NOTE: NORMAL
SPECIMEN ADEQUACY: NORMAL

## 2023-07-06 ENCOUNTER — CLINICAL ABSTRACT (OUTPATIENT)
Dept: HEALTH INFORMATION MANAGEMENT | Facility: OTHER | Age: 43
End: 2023-07-06

## 2023-07-18 ENCOUNTER — OFFICE VISIT (OUTPATIENT)
Dept: MATERNAL FETAL MEDICINE | Age: 43
End: 2023-07-18
Attending: OBSTETRICS & GYNECOLOGY

## 2023-07-18 ENCOUNTER — LAB SERVICES (OUTPATIENT)
Dept: LAB | Age: 43
End: 2023-07-18

## 2023-07-18 ENCOUNTER — EXTERNAL LAB (OUTPATIENT)
Dept: MATERNAL FETAL MEDICINE | Age: 43
End: 2023-07-18

## 2023-07-18 DIAGNOSIS — O09.511 PRIMIGRAVIDA OF ADVANCED MATERNAL AGE IN FIRST TRIMESTER: Primary | ICD-10-CM

## 2023-07-18 DIAGNOSIS — E66.9 OBESITY (BMI 30-39.9): ICD-10-CM

## 2023-07-18 LAB
BKR KIT TESTING DISCLAIMER STATEMENT: NORMAL
PRENATAL SCREENING (INVITAE) SUMMARY: NEGATIVE

## 2023-07-18 PROCEDURE — 76801 OB US < 14 WKS SINGLE FETUS: CPT | Performed by: OBSTETRICS & GYNECOLOGY

## 2023-07-18 PROCEDURE — 36415 COLL VENOUS BLD VENIPUNCTURE: CPT | Performed by: INTERNAL MEDICINE

## 2023-07-18 PROCEDURE — 96040 HB GENETIC COUNSELING EACH 30 MIN: CPT | Performed by: GENETIC COUNSELOR, MS

## 2023-07-20 ENCOUNTER — OFFICE VISIT (OUTPATIENT)
Dept: MATERNAL FETAL MEDICINE | Age: 43
End: 2023-07-20
Attending: OBSTETRICS & GYNECOLOGY

## 2023-07-20 ENCOUNTER — TELEPHONE (OUTPATIENT)
Dept: OBGYN | Age: 43
End: 2023-07-20

## 2023-07-20 VITALS
WEIGHT: 202.71 LBS | BODY MASS INDEX: 34.61 KG/M2 | HEIGHT: 64 IN | SYSTOLIC BLOOD PRESSURE: 133 MMHG | HEART RATE: 76 BPM | RESPIRATION RATE: 16 BRPM | DIASTOLIC BLOOD PRESSURE: 69 MMHG

## 2023-07-20 DIAGNOSIS — O09.511 PRIMIGRAVIDA OF ADVANCED MATERNAL AGE IN FIRST TRIMESTER: Primary | ICD-10-CM

## 2023-07-20 DIAGNOSIS — R87.610 ATYPICAL SQUAMOUS CELL CHANGES OF UNDETERMINED SIGNIFICANCE (ASCUS) ON CERVICAL CYTOLOGY WITH POSITIVE HIGH RISK HUMAN PAPILLOMA VIRUS (HPV): ICD-10-CM

## 2023-07-20 DIAGNOSIS — R03.0 ELEVATED BP WITHOUT DIAGNOSIS OF HYPERTENSION: ICD-10-CM

## 2023-07-20 DIAGNOSIS — R87.810 ATYPICAL SQUAMOUS CELL CHANGES OF UNDETERMINED SIGNIFICANCE (ASCUS) ON CERVICAL CYTOLOGY WITH POSITIVE HIGH RISK HUMAN PAPILLOMA VIRUS (HPV): ICD-10-CM

## 2023-07-20 DIAGNOSIS — Z36.89 ENCOUNTER FOR FETAL ANATOMIC SURVEY: Primary | ICD-10-CM

## 2023-07-20 DIAGNOSIS — Z87.42 HISTORY OF FEMALE INFERTILITY: ICD-10-CM

## 2023-07-20 DIAGNOSIS — E66.9 OBESITY (BMI 30-39.9): ICD-10-CM

## 2023-07-20 PROCEDURE — 99211 OFF/OP EST MAY X REQ PHY/QHP: CPT | Performed by: OBSTETRICS & GYNECOLOGY

## 2023-07-20 PROCEDURE — 99203 OFFICE O/P NEW LOW 30 MIN: CPT | Performed by: OBSTETRICS & GYNECOLOGY

## 2023-07-20 ASSESSMENT — PAIN SCALES - GENERAL: PAINLEVEL: 0

## 2023-07-25 ENCOUNTER — TELEPHONE (OUTPATIENT)
Dept: MATERNAL FETAL MEDICINE | Age: 43
End: 2023-07-25

## 2023-07-27 ENCOUNTER — OB CHECK (OUTPATIENT)
Dept: OBGYN | Age: 43
End: 2023-07-27

## 2023-07-27 VITALS
SYSTOLIC BLOOD PRESSURE: 133 MMHG | TEMPERATURE: 97.6 F | BODY MASS INDEX: 34.57 KG/M2 | DIASTOLIC BLOOD PRESSURE: 84 MMHG | HEART RATE: 73 BPM | WEIGHT: 202.5 LBS | HEIGHT: 64 IN

## 2023-07-27 DIAGNOSIS — O09.511 PRIMIGRAVIDA OF ADVANCED MATERNAL AGE IN FIRST TRIMESTER: ICD-10-CM

## 2023-07-27 DIAGNOSIS — Z34.90 ENCOUNTER FOR SUPERVISION OF NORMAL PREGNANCY, ANTEPARTUM, UNSPECIFIED GRAVIDITY: Primary | ICD-10-CM

## 2023-07-27 LAB
APPEARANCE, POC: CLEAR
BILIRUBIN, POC: NEGATIVE
COLOR, POC: YELLOW
GLUCOSE UR-MCNC: NEGATIVE MG/DL
KETONES, POC: NEGATIVE MG/DL
NITRITE, POC: NEGATIVE
OCCULT BLOOD, POC: NEGATIVE
PH UR: 6.5 [PH] (ref 5–7)
PROT UR-MCNC: NEGATIVE MG/DL
SP GR UR: 1 (ref 1–1.03)
UROBILINOGEN UR-MCNC: 0.2 MG/DL (ref 0–1)
WBC (LEUKOCYTE) ESTERASE, POC: NEGATIVE

## 2023-07-27 PROCEDURE — 0502F SUBSEQUENT PRENATAL CARE: CPT | Performed by: OBSTETRICS & GYNECOLOGY

## 2023-07-27 RX ORDER — ASPIRIN 81 MG/1
81 TABLET, CHEWABLE ORAL DAILY
COMMUNITY

## 2023-07-27 ASSESSMENT — PAIN SCALES - GENERAL: PAINLEVEL: 0

## 2023-08-24 ENCOUNTER — HOSPITAL ENCOUNTER (OUTPATIENT)
Dept: LAB | Age: 43
Discharge: HOME | End: 2023-08-24

## 2023-08-24 ENCOUNTER — OB CHECK (OUTPATIENT)
Dept: OBGYN | Age: 43
End: 2023-08-24

## 2023-08-24 VITALS
HEIGHT: 64 IN | BODY MASS INDEX: 34.93 KG/M2 | DIASTOLIC BLOOD PRESSURE: 77 MMHG | TEMPERATURE: 98.8 F | HEART RATE: 81 BPM | WEIGHT: 204.59 LBS | SYSTOLIC BLOOD PRESSURE: 121 MMHG

## 2023-08-24 DIAGNOSIS — Z34.90 ENCOUNTER FOR SUPERVISION OF NORMAL PREGNANCY, ANTEPARTUM, UNSPECIFIED GRAVIDITY: Primary | ICD-10-CM

## 2023-08-24 DIAGNOSIS — Z34.90 ENCOUNTER FOR SUPERVISION OF NORMAL PREGNANCY, ANTEPARTUM, UNSPECIFIED GRAVIDITY: ICD-10-CM

## 2023-08-24 LAB
APPEARANCE, POC: CLEAR
BILIRUBIN, POC: NEGATIVE
COLOR, POC: YELLOW
GLUCOSE UR-MCNC: NEGATIVE MG/DL
KETONES, POC: NEGATIVE MG/DL
NITRITE, POC: NEGATIVE
OCCULT BLOOD, POC: ABNORMAL
PH UR: 5.5 [PH] (ref 5–7)
PROT UR-MCNC: NEGATIVE MG/DL
SP GR UR: 1.02 (ref 1–1.03)
UROBILINOGEN UR-MCNC: 0.2 MG/DL (ref 0–1)
WBC (LEUKOCYTE) ESTERASE, POC: NEGATIVE

## 2023-08-24 PROCEDURE — 0502F SUBSEQUENT PRENATAL CARE: CPT | Performed by: OBSTETRICS & GYNECOLOGY

## 2023-08-24 PROCEDURE — 82105 ALPHA-FETOPROTEIN SERUM: CPT | Performed by: OBSTETRICS & GYNECOLOGY

## 2023-08-24 PROCEDURE — 36415 COLL VENOUS BLD VENIPUNCTURE: CPT | Performed by: OBSTETRICS & GYNECOLOGY

## 2023-08-24 ASSESSMENT — PAIN SCALES - GENERAL: PAINLEVEL: 0

## 2023-08-25 LAB
# FETUSES US: NORMAL
AFP MOM SERPL: 2.16 MOM
AFP SERPL-MCNC: 69.5 NG/ML
AGE AT DELIVERY: 43.31
GA: NORMAL WK
HX OF TRISOMY 21 NARR: NO
IDDM PATIENT QL: NO
METHOD BEST OVERALL GA ESTIMATE: NORMAL
NEURAL TUBE DEFECT RISK FETUS: NORMAL %
SERVICE CMNT-IMP: NORMAL
SERVICE CMNT-IMP: NORMAL
TS 21 RISK FETUS: NORMAL

## 2023-09-12 DIAGNOSIS — Z36.89 ENCOUNTER FOR FETAL ANATOMIC SURVEY: ICD-10-CM

## 2023-09-13 ENCOUNTER — OFFICE VISIT (OUTPATIENT)
Dept: MATERNAL FETAL MEDICINE | Age: 43
End: 2023-09-13
Attending: OBSTETRICS & GYNECOLOGY

## 2023-09-13 DIAGNOSIS — R03.0 ELEVATED BP WITHOUT DIAGNOSIS OF HYPERTENSION: ICD-10-CM

## 2023-09-13 DIAGNOSIS — O09.512 PRIMIGRAVIDA OF ADVANCED MATERNAL AGE IN SECOND TRIMESTER: Primary | ICD-10-CM

## 2023-09-13 DIAGNOSIS — E66.9 OBESITY (BMI 30-39.9): ICD-10-CM

## 2023-09-13 PROCEDURE — 76811 OB US DETAILED SNGL FETUS: CPT | Performed by: OBSTETRICS & GYNECOLOGY

## 2023-09-13 PROCEDURE — 76817 TRANSVAGINAL US OBSTETRIC: CPT | Performed by: OBSTETRICS & GYNECOLOGY

## 2023-09-15 ENCOUNTER — TELEPHONE (OUTPATIENT)
Dept: OBGYN | Age: 43
End: 2023-09-15

## 2023-09-15 DIAGNOSIS — Z3A.24 24 WEEKS GESTATION OF PREGNANCY: ICD-10-CM

## 2023-09-15 DIAGNOSIS — Z3A.22 22 WEEKS GESTATION OF PREGNANCY: Primary | ICD-10-CM

## 2023-09-19 ENCOUNTER — TELEPHONE (OUTPATIENT)
Dept: MATERNAL FETAL MEDICINE | Age: 43
End: 2023-09-19

## 2023-09-21 ENCOUNTER — OB CHECK (OUTPATIENT)
Dept: OBGYN | Age: 43
End: 2023-09-21

## 2023-09-21 ENCOUNTER — APPOINTMENT (OUTPATIENT)
Dept: OBGYN | Age: 43
End: 2023-09-21

## 2023-09-21 VITALS
HEIGHT: 64 IN | BODY MASS INDEX: 35.83 KG/M2 | WEIGHT: 209.9 LBS | DIASTOLIC BLOOD PRESSURE: 83 MMHG | TEMPERATURE: 97.7 F | SYSTOLIC BLOOD PRESSURE: 123 MMHG | HEART RATE: 87 BPM

## 2023-09-21 DIAGNOSIS — Z34.90 PREGNANCY, UNSPECIFIED GESTATIONAL AGE: Primary | ICD-10-CM

## 2023-09-21 DIAGNOSIS — Z23 NEED FOR INFLUENZA VACCINATION: ICD-10-CM

## 2023-09-21 LAB
APPEARANCE, POC: CLEAR
BILIRUBIN, POC: NEGATIVE
COLOR, POC: YELLOW
GLUCOSE UR-MCNC: ABNORMAL MG/DL
KETONES, POC: NEGATIVE MG/DL
NITRITE, POC: NEGATIVE
OCCULT BLOOD, POC: NEGATIVE
PH UR: 5 [PH] (ref 5–7)
PROT UR-MCNC: NEGATIVE MG/DL
SP GR UR: 1 (ref 1–1.03)
UROBILINOGEN UR-MCNC: 0.2 MG/DL (ref 0–1)
WBC (LEUKOCYTE) ESTERASE, POC: NEGATIVE

## 2023-09-21 PROCEDURE — 0502F SUBSEQUENT PRENATAL CARE: CPT | Performed by: OBSTETRICS & GYNECOLOGY

## 2023-09-21 PROCEDURE — 90471 IMMUNIZATION ADMIN: CPT | Performed by: OBSTETRICS & GYNECOLOGY

## 2023-09-21 PROCEDURE — 90686 IIV4 VACC NO PRSV 0.5 ML IM: CPT | Performed by: OBSTETRICS & GYNECOLOGY

## 2023-09-21 ASSESSMENT — PAIN SCALES - GENERAL: PAINLEVEL: 0

## 2023-09-26 DIAGNOSIS — Z3A.24 24 WEEKS GESTATION OF PREGNANCY: ICD-10-CM

## 2023-09-27 ENCOUNTER — OFFICE VISIT (OUTPATIENT)
Dept: MATERNAL FETAL MEDICINE | Age: 43
End: 2023-09-27
Attending: OBSTETRICS & GYNECOLOGY

## 2023-09-27 DIAGNOSIS — Z36.2 ENCOUNTER FOR FOLLOW-UP ULTRASOUND OF FETAL ANATOMY: Primary | ICD-10-CM

## 2023-09-27 PROCEDURE — 76816 OB US FOLLOW-UP PER FETUS: CPT | Performed by: OBSTETRICS & GYNECOLOGY

## 2023-09-29 ENCOUNTER — E-ADVICE (OUTPATIENT)
Dept: OBGYN | Age: 43
End: 2023-09-29

## 2023-10-11 ENCOUNTER — TELEPHONIC VISIT (OUTPATIENT)
Dept: MATERNAL FETAL MEDICINE | Age: 43
End: 2023-10-11
Attending: OBSTETRICS & GYNECOLOGY

## 2023-10-11 DIAGNOSIS — O09.519 ADVANCED MATERNAL AGE, PRIMIGRAVIDA, ANTEPARTUM: Primary | ICD-10-CM

## 2023-10-11 PROCEDURE — 76816 OB US FOLLOW-UP PER FETUS: CPT | Performed by: OBSTETRICS & GYNECOLOGY

## 2023-10-18 ENCOUNTER — OB CHECK (OUTPATIENT)
Dept: OBGYN | Age: 43
End: 2023-10-18

## 2023-10-18 VITALS
DIASTOLIC BLOOD PRESSURE: 77 MMHG | BODY MASS INDEX: 35.32 KG/M2 | HEIGHT: 64 IN | WEIGHT: 206.9 LBS | OXYGEN SATURATION: 97 % | HEART RATE: 74 BPM | SYSTOLIC BLOOD PRESSURE: 119 MMHG | TEMPERATURE: 97.3 F | RESPIRATION RATE: 17 BRPM

## 2023-10-18 DIAGNOSIS — O09.511 PRIMIGRAVIDA OF ADVANCED MATERNAL AGE IN FIRST TRIMESTER: Primary | ICD-10-CM

## 2023-10-18 LAB
APPEARANCE, POC: CLEAR
BILIRUBIN, POC: NEGATIVE
COLOR, POC: YELLOW
GLUCOSE UR-MCNC: NEGATIVE MG/DL
KETONES, POC: NEGATIVE MG/DL
NITRITE, POC: NEGATIVE
OCCULT BLOOD, POC: NEGATIVE
PH UR: 7 [PH] (ref 5–7)
PROT UR-MCNC: NEGATIVE MG/DL
SP GR UR: 1.02 (ref 1–1.03)
UROBILINOGEN UR-MCNC: 0.2 MG/DL (ref 0–1)
WBC (LEUKOCYTE) ESTERASE, POC: NEGATIVE

## 2023-10-18 PROCEDURE — 0502F SUBSEQUENT PRENATAL CARE: CPT | Performed by: OBSTETRICS & GYNECOLOGY

## 2023-10-18 ASSESSMENT — PAIN SCALES - GENERAL: PAINLEVEL: 0

## 2023-10-26 DIAGNOSIS — O09.511 PRIMIGRAVIDA OF ADVANCED MATERNAL AGE IN FIRST TRIMESTER: ICD-10-CM

## 2023-11-08 ENCOUNTER — HOSPITAL ENCOUNTER (OUTPATIENT)
Dept: LAB | Age: 43
Discharge: HOME | End: 2023-11-08
Attending: OBSTETRICS & GYNECOLOGY

## 2023-11-08 ENCOUNTER — OFFICE VISIT (OUTPATIENT)
Dept: MATERNAL FETAL MEDICINE | Age: 43
End: 2023-11-08
Attending: OBSTETRICS & GYNECOLOGY

## 2023-11-08 DIAGNOSIS — O09.511 PRIMIGRAVIDA OF ADVANCED MATERNAL AGE IN FIRST TRIMESTER: ICD-10-CM

## 2023-11-08 DIAGNOSIS — O09.513 PRIMIGRAVIDA OF ADVANCED MATERNAL AGE IN THIRD TRIMESTER: Primary | ICD-10-CM

## 2023-11-08 LAB
BASOPHILS # BLD: 0.1 K/MCL (ref 0–0.3)
BASOPHILS NFR BLD: 0 %
DEPRECATED RDW RBC: 48.9 FL (ref 39–50)
EOSINOPHIL # BLD: 0.1 K/MCL (ref 0–0.5)
EOSINOPHIL NFR BLD: 1 %
ERYTHROCYTE [DISTWIDTH] IN BLOOD: 13.8 % (ref 11–15)
GLUCOSE 1H P 50 G GLC PO SERPL-MCNC: 117 MG/DL (ref 65–139)
HCT VFR BLD CALC: 35.8 % (ref 36–46.5)
HGB BLD-MCNC: 11.8 G/DL (ref 12–15.5)
HIV 1+2 AB+HIV1 P24 AG SERPL QL IA: NONREACTIVE
IMM GRANULOCYTES # BLD AUTO: 0.1 K/MCL (ref 0–0.2)
IMM GRANULOCYTES # BLD: 1 %
LYMPHOCYTES # BLD: 2 K/MCL (ref 1–4.8)
LYMPHOCYTES NFR BLD: 15 %
MCH RBC QN AUTO: 31.7 PG (ref 26–34)
MCHC RBC AUTO-ENTMCNC: 33 G/DL (ref 32–36.5)
MCV RBC AUTO: 96.2 FL (ref 78–100)
MONOCYTES # BLD: 0.7 K/MCL (ref 0.3–0.9)
MONOCYTES NFR BLD: 5 %
NEUTROPHILS # BLD: 10.6 K/MCL (ref 1.8–7.7)
NEUTROPHILS NFR BLD: 78 %
NRBC BLD MANUAL-RTO: 0 /100 WBC
PLATELET # BLD AUTO: 204 K/MCL (ref 140–450)
RBC # BLD: 3.72 MIL/MCL (ref 4–5.2)
WBC # BLD: 13.5 K/MCL (ref 4.2–11)

## 2023-11-08 PROCEDURE — 36415 COLL VENOUS BLD VENIPUNCTURE: CPT | Performed by: OBSTETRICS & GYNECOLOGY

## 2023-11-08 PROCEDURE — 82950 GLUCOSE TEST: CPT | Performed by: OBSTETRICS & GYNECOLOGY

## 2023-11-08 PROCEDURE — 76816 OB US FOLLOW-UP PER FETUS: CPT | Performed by: OBSTETRICS & GYNECOLOGY

## 2023-11-08 PROCEDURE — 85025 COMPLETE CBC W/AUTO DIFF WBC: CPT | Performed by: OBSTETRICS & GYNECOLOGY

## 2023-11-08 PROCEDURE — 86592 SYPHILIS TEST NON-TREP QUAL: CPT | Performed by: OBSTETRICS & GYNECOLOGY

## 2023-11-08 PROCEDURE — 87389 HIV-1 AG W/HIV-1&-2 AB AG IA: CPT | Performed by: OBSTETRICS & GYNECOLOGY

## 2023-11-09 LAB — RPR SER QL: NONREACTIVE

## 2023-11-16 ENCOUNTER — OB CHECK (OUTPATIENT)
Dept: OBGYN | Age: 43
End: 2023-11-16

## 2023-11-16 VITALS
DIASTOLIC BLOOD PRESSURE: 71 MMHG | HEIGHT: 64 IN | SYSTOLIC BLOOD PRESSURE: 119 MMHG | HEART RATE: 67 BPM | BODY MASS INDEX: 35.98 KG/M2 | WEIGHT: 210.76 LBS | TEMPERATURE: 98 F

## 2023-11-16 DIAGNOSIS — Z23 NEED FOR TDAP VACCINATION: ICD-10-CM

## 2023-11-16 DIAGNOSIS — Z34.90 PREGNANCY, UNSPECIFIED GESTATIONAL AGE: Primary | ICD-10-CM

## 2023-11-16 LAB
APPEARANCE, POC: CLEAR
BILIRUBIN, POC: NEGATIVE
COLOR, POC: YELLOW
GLUCOSE UR-MCNC: ABNORMAL MG/DL
KETONES, POC: NEGATIVE MG/DL
NITRITE, POC: NEGATIVE
OCCULT BLOOD, POC: NEGATIVE
PH UR: 7 [PH] (ref 5–7)
PROT UR-MCNC: NEGATIVE MG/DL
SP GR UR: 1.01 (ref 1–1.03)
UROBILINOGEN UR-MCNC: 0.2 MG/DL (ref 0–1)
WBC (LEUKOCYTE) ESTERASE, POC: NEGATIVE

## 2023-11-16 PROCEDURE — 90471 IMMUNIZATION ADMIN: CPT | Performed by: OBSTETRICS & GYNECOLOGY

## 2023-11-16 PROCEDURE — 0502F SUBSEQUENT PRENATAL CARE: CPT | Performed by: OBSTETRICS & GYNECOLOGY

## 2023-11-16 PROCEDURE — 90715 TDAP VACCINE 7 YRS/> IM: CPT | Performed by: OBSTETRICS & GYNECOLOGY

## 2023-11-16 ASSESSMENT — PAIN SCALES - GENERAL: PAINLEVEL: 0

## 2023-11-21 ENCOUNTER — E-ADVICE (OUTPATIENT)
Dept: OBGYN | Age: 43
End: 2023-11-21

## 2023-11-30 ENCOUNTER — APPOINTMENT (OUTPATIENT)
Dept: OBGYN | Age: 43
End: 2023-11-30

## 2023-11-30 VITALS
TEMPERATURE: 97.2 F | WEIGHT: 211.2 LBS | HEIGHT: 64 IN | HEART RATE: 78 BPM | DIASTOLIC BLOOD PRESSURE: 83 MMHG | BODY MASS INDEX: 36.06 KG/M2 | SYSTOLIC BLOOD PRESSURE: 130 MMHG

## 2023-11-30 DIAGNOSIS — O09.511 PRIMIGRAVIDA OF ADVANCED MATERNAL AGE IN FIRST TRIMESTER: ICD-10-CM

## 2023-11-30 DIAGNOSIS — Z34.90 PREGNANCY, UNSPECIFIED GESTATIONAL AGE: Primary | ICD-10-CM

## 2023-11-30 LAB
APPEARANCE, POC: CLEAR
BILIRUBIN, POC: NEGATIVE
COLOR, POC: YELLOW
GLUCOSE UR-MCNC: ABNORMAL MG/DL
KETONES, POC: NEGATIVE MG/DL
NITRITE, POC: NEGATIVE
OCCULT BLOOD, POC: NEGATIVE
PH UR: 6 [PH] (ref 5–7)
PROT UR-MCNC: NEGATIVE MG/DL
SP GR UR: 1 (ref 1–1.03)
UROBILINOGEN UR-MCNC: 0.2 MG/DL (ref 0–1)
WBC (LEUKOCYTE) ESTERASE, POC: NEGATIVE

## 2023-11-30 PROCEDURE — 0502F SUBSEQUENT PRENATAL CARE: CPT | Performed by: OBSTETRICS & GYNECOLOGY

## 2023-11-30 ASSESSMENT — PAIN SCALES - GENERAL: PAINLEVEL: 0

## 2023-12-05 ENCOUNTER — OFFICE VISIT (OUTPATIENT)
Dept: MATERNAL FETAL MEDICINE | Age: 43
End: 2023-12-05
Attending: OBSTETRICS & GYNECOLOGY

## 2023-12-05 DIAGNOSIS — Z34.90 PREGNANCY, UNSPECIFIED GESTATIONAL AGE: ICD-10-CM

## 2023-12-05 DIAGNOSIS — O09.511 PRIMIGRAVIDA OF ADVANCED MATERNAL AGE IN FIRST TRIMESTER: Primary | ICD-10-CM

## 2023-12-05 PROCEDURE — 76816 OB US FOLLOW-UP PER FETUS: CPT | Performed by: OBSTETRICS & GYNECOLOGY

## 2023-12-07 ENCOUNTER — OB CHECK (OUTPATIENT)
Dept: MATERNAL FETAL MEDICINE | Age: 43
End: 2023-12-07
Attending: OBSTETRICS & GYNECOLOGY

## 2023-12-07 VITALS — SYSTOLIC BLOOD PRESSURE: 125 MMHG | DIASTOLIC BLOOD PRESSURE: 58 MMHG

## 2023-12-07 DIAGNOSIS — O09.511 PRIMIGRAVIDA OF ADVANCED MATERNAL AGE IN FIRST TRIMESTER: Primary | ICD-10-CM

## 2023-12-07 PROCEDURE — 76815 OB US LIMITED FETUS(S): CPT | Performed by: OBSTETRICS & GYNECOLOGY

## 2023-12-07 PROCEDURE — 59025 FETAL NON-STRESS TEST: CPT | Performed by: OBSTETRICS & GYNECOLOGY

## 2023-12-14 ENCOUNTER — APPOINTMENT (OUTPATIENT)
Dept: OBGYN | Age: 43
End: 2023-12-14

## 2023-12-14 ENCOUNTER — OB CHECK (OUTPATIENT)
Dept: MATERNAL FETAL MEDICINE | Age: 43
End: 2023-12-14
Attending: OBSTETRICS & GYNECOLOGY

## 2023-12-14 VITALS — SYSTOLIC BLOOD PRESSURE: 127 MMHG | DIASTOLIC BLOOD PRESSURE: 62 MMHG

## 2023-12-14 VITALS
WEIGHT: 214.4 LBS | DIASTOLIC BLOOD PRESSURE: 74 MMHG | SYSTOLIC BLOOD PRESSURE: 111 MMHG | TEMPERATURE: 98 F | HEART RATE: 71 BPM | HEIGHT: 64 IN | BODY MASS INDEX: 36.6 KG/M2

## 2023-12-14 DIAGNOSIS — Z29.11 NEED FOR RSV VACCINATION: ICD-10-CM

## 2023-12-14 DIAGNOSIS — O09.511 PRIMIGRAVIDA OF ADVANCED MATERNAL AGE IN FIRST TRIMESTER: Primary | ICD-10-CM

## 2023-12-14 DIAGNOSIS — Z3A.33 33 WEEKS GESTATION OF PREGNANCY: Primary | ICD-10-CM

## 2023-12-14 LAB
APPEARANCE, POC: CLEAR
BILIRUBIN, POC: NEGATIVE
COLOR, POC: YELLOW
GLUCOSE UR-MCNC: NEGATIVE MG/DL
KETONES, POC: ABNORMAL MG/DL
NITRITE, POC: NEGATIVE
OCCULT BLOOD, POC: NEGATIVE
PH UR: 5 [PH] (ref 5–7)
PROT UR-MCNC: NEGATIVE MG/DL
SP GR UR: <= 1.005 (ref 1–1.03)
UROBILINOGEN UR-MCNC: 0.2 MG/DL (ref 0–1)
WBC (LEUKOCYTE) ESTERASE, POC: NEGATIVE

## 2023-12-14 PROCEDURE — 90471 IMMUNIZATION ADMIN: CPT | Performed by: OBSTETRICS & GYNECOLOGY

## 2023-12-14 PROCEDURE — 0502F SUBSEQUENT PRENATAL CARE: CPT | Performed by: OBSTETRICS & GYNECOLOGY

## 2023-12-14 PROCEDURE — 59025 FETAL NON-STRESS TEST: CPT | Performed by: OBSTETRICS & GYNECOLOGY

## 2023-12-14 PROCEDURE — 76815 OB US LIMITED FETUS(S): CPT | Performed by: OBSTETRICS & GYNECOLOGY

## 2023-12-14 PROCEDURE — 90678 RSV VACC PREF BIVALENT IM: CPT | Performed by: OBSTETRICS & GYNECOLOGY

## 2023-12-14 ASSESSMENT — PAIN SCALES - GENERAL: PAINLEVEL: 0

## 2023-12-18 ENCOUNTER — E-ADVICE (OUTPATIENT)
Dept: OBGYN | Age: 43
End: 2023-12-18

## 2023-12-21 ENCOUNTER — OB CHECK (OUTPATIENT)
Dept: MATERNAL FETAL MEDICINE | Age: 43
End: 2023-12-21
Attending: OBSTETRICS & GYNECOLOGY

## 2023-12-21 VITALS — DIASTOLIC BLOOD PRESSURE: 69 MMHG | SYSTOLIC BLOOD PRESSURE: 127 MMHG

## 2023-12-21 DIAGNOSIS — O09.511 PRIMIGRAVIDA OF ADVANCED MATERNAL AGE IN FIRST TRIMESTER: Primary | ICD-10-CM

## 2023-12-21 PROCEDURE — 76816 OB US FOLLOW-UP PER FETUS: CPT | Performed by: OBSTETRICS & GYNECOLOGY

## 2023-12-21 PROCEDURE — 59025 FETAL NON-STRESS TEST: CPT | Performed by: OBSTETRICS & GYNECOLOGY

## 2023-12-21 PROCEDURE — 76815 OB US LIMITED FETUS(S): CPT | Performed by: OBSTETRICS & GYNECOLOGY

## 2023-12-27 ENCOUNTER — APPOINTMENT (OUTPATIENT)
Dept: OBGYN | Age: 43
End: 2023-12-27

## 2023-12-27 VITALS
DIASTOLIC BLOOD PRESSURE: 81 MMHG | WEIGHT: 214.95 LBS | BODY MASS INDEX: 36.7 KG/M2 | HEIGHT: 64 IN | TEMPERATURE: 97.8 F | SYSTOLIC BLOOD PRESSURE: 125 MMHG | HEART RATE: 68 BPM

## 2023-12-27 DIAGNOSIS — Z34.90 PREGNANCY, UNSPECIFIED GESTATIONAL AGE: Primary | ICD-10-CM

## 2023-12-27 PROCEDURE — 87081 CULTURE SCREEN ONLY: CPT | Performed by: CLINICAL MEDICAL LABORATORY

## 2023-12-27 PROCEDURE — 0502F SUBSEQUENT PRENATAL CARE: CPT | Performed by: STUDENT IN AN ORGANIZED HEALTH CARE EDUCATION/TRAINING PROGRAM

## 2023-12-27 ASSESSMENT — PAIN SCALES - GENERAL: PAINLEVEL: 0

## 2023-12-28 ENCOUNTER — OB CHECK (OUTPATIENT)
Dept: MATERNAL FETAL MEDICINE | Age: 43
End: 2023-12-28
Attending: OBSTETRICS & GYNECOLOGY

## 2023-12-28 DIAGNOSIS — O09.513 PRIMIGRAVIDA OF ADVANCED MATERNAL AGE IN THIRD TRIMESTER: Primary | ICD-10-CM

## 2023-12-28 PROCEDURE — 76815 OB US LIMITED FETUS(S): CPT | Performed by: OBSTETRICS & GYNECOLOGY

## 2023-12-28 PROCEDURE — 59025 FETAL NON-STRESS TEST: CPT | Performed by: OBSTETRICS & GYNECOLOGY

## 2023-12-29 LAB — GP B STREP SPEC QL CULT: NORMAL

## 2024-01-03 ENCOUNTER — OB CHECK (OUTPATIENT)
Dept: OBGYN | Age: 44
End: 2024-01-03

## 2024-01-03 ENCOUNTER — OFFICE VISIT (OUTPATIENT)
Dept: MATERNAL FETAL MEDICINE | Age: 44
End: 2024-01-03
Attending: OBSTETRICS & GYNECOLOGY

## 2024-01-03 VITALS
BODY MASS INDEX: 37.28 KG/M2 | HEIGHT: 64 IN | DIASTOLIC BLOOD PRESSURE: 73 MMHG | SYSTOLIC BLOOD PRESSURE: 108 MMHG | HEART RATE: 58 BPM | TEMPERATURE: 98 F | WEIGHT: 218.37 LBS

## 2024-01-03 DIAGNOSIS — O09.513 PRIMIGRAVIDA OF ADVANCED MATERNAL AGE IN THIRD TRIMESTER: ICD-10-CM

## 2024-01-03 DIAGNOSIS — E66.9 OBESITY (BMI 30-39.9): Primary | ICD-10-CM

## 2024-01-03 DIAGNOSIS — Z3A.36 36 WEEKS GESTATION OF PREGNANCY: Primary | ICD-10-CM

## 2024-01-03 DIAGNOSIS — Z3A.36 36 WEEKS GESTATION OF PREGNANCY: ICD-10-CM

## 2024-01-03 LAB
APPEARANCE, POC: CLEAR
BILIRUBIN, POC: NEGATIVE
COLOR, POC: YELLOW
GLUCOSE UR-MCNC: NEGATIVE MG/DL
KETONES, POC: NEGATIVE MG/DL
NITRITE, POC: NEGATIVE
OCCULT BLOOD, POC: NEGATIVE
PH UR: 6.5 [PH] (ref 5–7)
PROT UR-MCNC: NEGATIVE MG/DL
SP GR UR: 1.01 (ref 1–1.03)
UROBILINOGEN UR-MCNC: 0.2 MG/DL (ref 0–1)
WBC (LEUKOCYTE) ESTERASE, POC: NEGATIVE

## 2024-01-03 PROCEDURE — 0502F SUBSEQUENT PRENATAL CARE: CPT | Performed by: STUDENT IN AN ORGANIZED HEALTH CARE EDUCATION/TRAINING PROGRAM

## 2024-01-03 PROCEDURE — 76816 OB US FOLLOW-UP PER FETUS: CPT | Performed by: OBSTETRICS & GYNECOLOGY

## 2024-01-03 ASSESSMENT — PAIN SCALES - GENERAL: PAINLEVEL: 0

## 2024-01-04 ENCOUNTER — OB CHECK (OUTPATIENT)
Dept: MATERNAL FETAL MEDICINE | Age: 44
End: 2024-01-04
Attending: OBSTETRICS & GYNECOLOGY

## 2024-01-04 VITALS — SYSTOLIC BLOOD PRESSURE: 129 MMHG | DIASTOLIC BLOOD PRESSURE: 70 MMHG

## 2024-01-04 DIAGNOSIS — O09.513 PRIMIGRAVIDA OF ADVANCED MATERNAL AGE IN THIRD TRIMESTER: Primary | ICD-10-CM

## 2024-01-04 PROCEDURE — 76815 OB US LIMITED FETUS(S): CPT | Performed by: OBSTETRICS & GYNECOLOGY

## 2024-01-04 PROCEDURE — 59025 FETAL NON-STRESS TEST: CPT | Performed by: OBSTETRICS & GYNECOLOGY

## 2024-01-11 ENCOUNTER — APPOINTMENT (OUTPATIENT)
Dept: OBGYN | Age: 44
End: 2024-01-11

## 2024-01-11 ENCOUNTER — OB CHECK (OUTPATIENT)
Dept: MATERNAL FETAL MEDICINE | Age: 44
End: 2024-01-11
Attending: OBSTETRICS & GYNECOLOGY

## 2024-01-11 VITALS
DIASTOLIC BLOOD PRESSURE: 83 MMHG | TEMPERATURE: 97 F | HEART RATE: 66 BPM | WEIGHT: 217.48 LBS | SYSTOLIC BLOOD PRESSURE: 126 MMHG | BODY MASS INDEX: 37.33 KG/M2

## 2024-01-11 VITALS — SYSTOLIC BLOOD PRESSURE: 132 MMHG | DIASTOLIC BLOOD PRESSURE: 69 MMHG

## 2024-01-11 DIAGNOSIS — O09.513 PRIMIGRAVIDA OF ADVANCED MATERNAL AGE IN THIRD TRIMESTER: Primary | ICD-10-CM

## 2024-01-11 DIAGNOSIS — Z3A.37 37 WEEKS GESTATION OF PREGNANCY: Primary | ICD-10-CM

## 2024-01-11 LAB
APPEARANCE, POC: CLEAR
BILIRUBIN, POC: NEGATIVE
COLOR, POC: YELLOW
GLUCOSE UR-MCNC: NEGATIVE MG/DL
KETONES, POC: NEGATIVE MG/DL
NITRITE, POC: NEGATIVE
OCCULT BLOOD, POC: NEGATIVE
PH UR: 5.5 [PH] (ref 5–7)
PROT UR-MCNC: NEGATIVE MG/DL
SP GR UR: 1 (ref 1–1.03)
UROBILINOGEN UR-MCNC: 0.2 MG/DL (ref 0–1)
WBC (LEUKOCYTE) ESTERASE, POC: NEGATIVE

## 2024-01-11 PROCEDURE — 76815 OB US LIMITED FETUS(S): CPT | Performed by: OBSTETRICS & GYNECOLOGY

## 2024-01-11 PROCEDURE — 0502F SUBSEQUENT PRENATAL CARE: CPT | Performed by: OBSTETRICS & GYNECOLOGY

## 2024-01-11 PROCEDURE — 59025 FETAL NON-STRESS TEST: CPT | Performed by: OBSTETRICS & GYNECOLOGY

## 2024-01-18 ENCOUNTER — APPOINTMENT (OUTPATIENT)
Dept: OBGYN | Age: 44
End: 2024-01-18

## 2024-01-18 ENCOUNTER — OB CHECK (OUTPATIENT)
Dept: MATERNAL FETAL MEDICINE | Age: 44
End: 2024-01-18
Attending: OBSTETRICS & GYNECOLOGY

## 2024-01-18 VITALS
SYSTOLIC BLOOD PRESSURE: 135 MMHG | BODY MASS INDEX: 37.47 KG/M2 | TEMPERATURE: 97.7 F | HEART RATE: 68 BPM | RESPIRATION RATE: 16 BRPM | WEIGHT: 218.3 LBS | DIASTOLIC BLOOD PRESSURE: 81 MMHG

## 2024-01-18 VITALS — DIASTOLIC BLOOD PRESSURE: 66 MMHG | SYSTOLIC BLOOD PRESSURE: 121 MMHG

## 2024-01-18 DIAGNOSIS — O09.511 PRIMIGRAVIDA OF ADVANCED MATERNAL AGE IN FIRST TRIMESTER: Primary | ICD-10-CM

## 2024-01-18 DIAGNOSIS — E66.9 OBESITY (BMI 30-39.9): ICD-10-CM

## 2024-01-18 DIAGNOSIS — O09.513 PRIMIGRAVIDA OF ADVANCED MATERNAL AGE IN THIRD TRIMESTER: Primary | ICD-10-CM

## 2024-01-18 LAB
BILIRUBIN, POC: NEGATIVE
GLUCOSE UR-MCNC: NEGATIVE MG/DL
KETONES, POC: NEGATIVE MG/DL
NITRITE, POC: NEGATIVE
OCCULT BLOOD, POC: NEGATIVE
PH UR: 6.5 [PH] (ref 5–7)
PROT UR-MCNC: NEGATIVE MG/DL
SP GR UR: 1.01 (ref 1–1.03)
UROBILINOGEN UR-MCNC: 0.2 MG/DL (ref 0–1)
WBC (LEUKOCYTE) ESTERASE, POC: NEGATIVE

## 2024-01-18 PROCEDURE — 59025 FETAL NON-STRESS TEST: CPT | Performed by: OBSTETRICS & GYNECOLOGY

## 2024-01-18 PROCEDURE — 76815 OB US LIMITED FETUS(S): CPT | Performed by: OBSTETRICS & GYNECOLOGY

## 2024-01-18 PROCEDURE — 0502F SUBSEQUENT PRENATAL CARE: CPT | Performed by: OBSTETRICS & GYNECOLOGY

## 2024-01-18 ASSESSMENT — PAIN SCALES - GENERAL: PAINLEVEL: 0

## 2024-01-20 ENCOUNTER — E-ADVICE (OUTPATIENT)
Dept: OBGYN | Age: 44
End: 2024-01-20

## 2024-01-25 ENCOUNTER — OB CHECK (OUTPATIENT)
Dept: MATERNAL FETAL MEDICINE | Age: 44
End: 2024-01-25
Attending: OBSTETRICS & GYNECOLOGY

## 2024-01-25 ENCOUNTER — HOSPITAL ENCOUNTER (INPATIENT)
Age: 44
End: 2024-01-25
Attending: OBSTETRICS & GYNECOLOGY | Admitting: OBSTETRICS & GYNECOLOGY

## 2024-01-25 ENCOUNTER — OB CHECK (OUTPATIENT)
Dept: OBGYN | Age: 44
End: 2024-01-25

## 2024-01-25 VITALS
DIASTOLIC BLOOD PRESSURE: 88 MMHG | HEART RATE: 86 BPM | RESPIRATION RATE: 18 BRPM | SYSTOLIC BLOOD PRESSURE: 143 MMHG | HEIGHT: 64 IN | WEIGHT: 220.3 LBS | BODY MASS INDEX: 37.61 KG/M2

## 2024-01-25 VITALS — SYSTOLIC BLOOD PRESSURE: 133 MMHG | DIASTOLIC BLOOD PRESSURE: 68 MMHG

## 2024-01-25 DIAGNOSIS — O09.513 PRIMIGRAVIDA OF ADVANCED MATERNAL AGE IN THIRD TRIMESTER: Primary | ICD-10-CM

## 2024-01-25 DIAGNOSIS — Z34.90 PREGNANCY, UNSPECIFIED GESTATIONAL AGE: Primary | ICD-10-CM

## 2024-01-25 LAB
ABO + RH BLD: NORMAL
ALBUMIN SERPL-MCNC: 2.6 G/DL (ref 3.6–5.1)
ALBUMIN/GLOB SERPL: 0.6 {RATIO} (ref 1–2.4)
ALP SERPL-CCNC: 138 UNITS/L (ref 45–117)
ALT SERPL-CCNC: 23 UNITS/L
ANION GAP SERPL CALC-SCNC: 14 MMOL/L (ref 7–19)
APPEARANCE, POC: NORMAL
AST SERPL-CCNC: 23 UNITS/L
BASOPHILS # BLD: 0.1 K/MCL (ref 0–0.3)
BASOPHILS NFR BLD: 0 %
BILIRUB SERPL-MCNC: 0.3 MG/DL (ref 0.2–1)
BILIRUBIN, POC: NEGATIVE
BLD GP AB SCN SERPL QL GEL: NEGATIVE
BUN SERPL-MCNC: 9 MG/DL (ref 6–20)
BUN/CREAT SERPL: 13 (ref 7–25)
CALCIUM SERPL-MCNC: 9.1 MG/DL (ref 8.4–10.2)
CHLORIDE SERPL-SCNC: 100 MMOL/L (ref 97–110)
CO2 SERPL-SCNC: 24 MMOL/L (ref 21–32)
COLOR, POC: YELLOW
CREAT SERPL-MCNC: 0.68 MG/DL (ref 0.51–0.95)
CREAT UR-MCNC: 12.25 MG/DL
DEPRECATED RDW RBC: 48.3 FL (ref 39–50)
EGFRCR SERPLBLD CKD-EPI 2021: >90 ML/MIN/{1.73_M2}
EOSINOPHIL # BLD: 0.1 K/MCL (ref 0–0.5)
EOSINOPHIL NFR BLD: 1 %
ERYTHROCYTE [DISTWIDTH] IN BLOOD: 13.9 % (ref 11–15)
FASTING DURATION TIME PATIENT: ABNORMAL H
GLOBULIN SER-MCNC: 4.2 G/DL (ref 2–4)
GLUCOSE SERPL-MCNC: 97 MG/DL (ref 70–99)
GLUCOSE UR-MCNC: NEGATIVE MG/DL
HCT VFR BLD CALC: 37.8 % (ref 36–46.5)
HGB BLD-MCNC: 12.8 G/DL (ref 12–15.5)
IMM GRANULOCYTES # BLD AUTO: 0.1 K/MCL (ref 0–0.2)
IMM GRANULOCYTES # BLD: 1 %
KETONES, POC: NEGATIVE MG/DL
LDH SERPL L TO P-CCNC: 195 UNITS/L (ref 82–240)
LYMPHOCYTES # BLD: 2.7 K/MCL (ref 1–4.8)
LYMPHOCYTES NFR BLD: 19 %
MCH RBC QN AUTO: 32.2 PG (ref 26–34)
MCHC RBC AUTO-ENTMCNC: 33.9 G/DL (ref 32–36.5)
MCV RBC AUTO: 95 FL (ref 78–100)
MONOCYTES # BLD: 0.8 K/MCL (ref 0.3–0.9)
MONOCYTES NFR BLD: 6 %
NEUTROPHILS # BLD: 10.2 K/MCL (ref 1.8–7.7)
NEUTROPHILS NFR BLD: 73 %
NITRITE, POC: NEGATIVE
NRBC BLD MANUAL-RTO: 0 /100 WBC
OCCULT BLOOD, POC: NEGATIVE
PH UR: 6.5 [PH] (ref 5–7)
PLATELET # BLD AUTO: 191 K/MCL (ref 140–450)
POTASSIUM SERPL-SCNC: 3.9 MMOL/L (ref 3.4–5.1)
PROT SERPL-MCNC: 6.8 G/DL (ref 6.4–8.2)
PROT UR-MCNC: <6 MG/DL
PROT UR-MCNC: NEGATIVE MG/DL
PROT/CREAT UR: NORMAL MG/G{CREAT}
RBC # BLD: 3.98 MIL/MCL (ref 4–5.2)
SODIUM SERPL-SCNC: 134 MMOL/L (ref 135–145)
SP GR UR: 1.01 (ref 1–1.03)
TYPE AND SCREEN EXPIRATION DATE: NORMAL
URATE SERPL-MCNC: 4.4 MG/DL (ref 2.6–5.9)
UROBILINOGEN UR-MCNC: 0.2 MG/DL (ref 0–1)
WBC # BLD: 13.9 K/MCL (ref 4.2–11)
WBC (LEUKOCYTE) ESTERASE, POC: NEGATIVE

## 2024-01-25 PROCEDURE — 83615 LACTATE (LD) (LDH) ENZYME: CPT | Performed by: MIDWIFE

## 2024-01-25 PROCEDURE — 76815 OB US LIMITED FETUS(S): CPT | Performed by: OBSTETRICS & GYNECOLOGY

## 2024-01-25 PROCEDURE — 86901 BLOOD TYPING SEROLOGIC RH(D): CPT

## 2024-01-25 PROCEDURE — 84550 ASSAY OF BLOOD/URIC ACID: CPT | Performed by: MIDWIFE

## 2024-01-25 PROCEDURE — 99204 OFFICE O/P NEW MOD 45 MIN: CPT

## 2024-01-25 PROCEDURE — 80053 COMPREHEN METABOLIC PANEL: CPT | Performed by: MIDWIFE

## 2024-01-25 PROCEDURE — 59025 FETAL NON-STRESS TEST: CPT | Performed by: OBSTETRICS & GYNECOLOGY

## 2024-01-25 PROCEDURE — 13003286 HB ROOM CHARGE L&D

## 2024-01-25 PROCEDURE — 85025 COMPLETE CBC W/AUTO DIFF WBC: CPT | Performed by: MIDWIFE

## 2024-01-25 PROCEDURE — 99221 1ST HOSP IP/OBS SF/LOW 40: CPT | Performed by: MIDWIFE

## 2024-01-25 PROCEDURE — 3E0P7VZ INTRODUCTION OF HORMONE INTO FEMALE REPRODUCTIVE, VIA NATURAL OR ARTIFICIAL OPENING: ICD-10-PCS | Performed by: OBSTETRICS & GYNECOLOGY

## 2024-01-25 PROCEDURE — 10002803 HB RX 637: Performed by: OBSTETRICS & GYNECOLOGY

## 2024-01-25 PROCEDURE — 0502F SUBSEQUENT PRENATAL CARE: CPT | Performed by: OBSTETRICS & GYNECOLOGY

## 2024-01-25 PROCEDURE — 84156 ASSAY OF PROTEIN URINE: CPT | Performed by: MIDWIFE

## 2024-01-25 RX ORDER — SODIUM CHLORIDE, SODIUM LACTATE, POTASSIUM CHLORIDE, CALCIUM CHLORIDE 600; 310; 30; 20 MG/100ML; MG/100ML; MG/100ML; MG/100ML
INJECTION, SOLUTION INTRAVENOUS CONTINUOUS
Status: DISCONTINUED | OUTPATIENT
Start: 2024-01-25 | End: 2024-01-26

## 2024-01-25 RX ORDER — 0.9 % SODIUM CHLORIDE 0.9 %
2 VIAL (ML) INJECTION EVERY 12 HOURS SCHEDULED
Status: DISCONTINUED | OUTPATIENT
Start: 2024-01-25 | End: 2024-01-26

## 2024-01-25 RX ORDER — OXYTOCIN 10 [USP'U]/ML
10 INJECTION, SOLUTION INTRAMUSCULAR; INTRAVENOUS
Status: DISCONTINUED | OUTPATIENT
Start: 2024-01-25 | End: 2024-01-26

## 2024-01-25 RX ORDER — ONDANSETRON 2 MG/ML
4 INJECTION INTRAMUSCULAR; INTRAVENOUS 2 TIMES DAILY PRN
Status: DISCONTINUED | OUTPATIENT
Start: 2024-01-25 | End: 2024-01-26

## 2024-01-25 RX ORDER — OXYTOCIN/0.9 % SODIUM CHLORIDE 30/500 ML
0-25 PLASTIC BAG, INJECTION (ML) INTRAVENOUS CONTINUOUS
Status: DISCONTINUED | OUTPATIENT
Start: 2024-01-25 | End: 2024-01-25

## 2024-01-25 RX ORDER — LIDOCAINE HYDROCHLORIDE 10 MG/ML
30 INJECTION, SOLUTION EPIDURAL; INFILTRATION; INTRACAUDAL; PERINEURAL
Status: DISCONTINUED | OUTPATIENT
Start: 2024-01-25 | End: 2024-01-26

## 2024-01-25 RX ORDER — OXYTOCIN-SODIUM CHLORIDE 0.9% IV SOLN 30 UNIT/500ML 30-0.9/5 UT/ML-%
0-334 SOLUTION INTRAVENOUS CONTINUOUS
Status: DISCONTINUED | OUTPATIENT
Start: 2024-01-25 | End: 2024-01-26

## 2024-01-25 RX ORDER — OXYTOCIN/0.9 % SODIUM CHLORIDE 30/500 ML
0-25 PLASTIC BAG, INJECTION (ML) INTRAVENOUS CONTINUOUS
Status: DISCONTINUED | OUTPATIENT
Start: 2024-01-26 | End: 2024-01-26

## 2024-01-25 RX ORDER — CALCIUM CARBONATE 500 MG/1
500 TABLET, CHEWABLE ORAL EVERY 4 HOURS PRN
Status: DISCONTINUED | OUTPATIENT
Start: 2024-01-25 | End: 2024-01-26

## 2024-01-25 RX ADMIN — Medication 25 MCG: at 19:05

## 2024-01-25 SDOH — HEALTH STABILITY: PHYSICAL HEALTH: DO YOU HAVE DIFFICULTY DRESSING OR BATHING?: NO

## 2024-01-25 SDOH — ECONOMIC STABILITY: HOUSING INSECURITY: WHAT IS YOUR LIVING SITUATION TODAY?: SPOUSE

## 2024-01-25 SDOH — ECONOMIC STABILITY: HOUSING INSECURITY: WHAT IS YOUR LIVING SITUATION TODAY?: I HAVE A STEADY PLACE TO LIVE

## 2024-01-25 SDOH — SOCIAL STABILITY: SOCIAL NETWORK: SUPPORT SYSTEMS: FAMILY MEMBERS;FRIENDS;SPOUSE

## 2024-01-25 SDOH — SOCIAL STABILITY: SOCIAL INSECURITY: HOW OFTEN DOES ANYONE, INCLUDING FAMILY AND FRIENDS, SCREAM OR CURSE AT YOU?: PATIENT DECLINED

## 2024-01-25 SDOH — SOCIAL STABILITY: SOCIAL INSECURITY: HOW OFTEN DOES ANYONE, INCLUDING FAMILY AND FRIENDS, PHYSICALLY HURT YOU?: PATIENT DECLINED

## 2024-01-25 SDOH — ECONOMIC STABILITY: FOOD INSECURITY: WITHIN THE PAST 12 MONTHS, THE FOOD YOU BOUGHT JUST DIDN'T LAST AND YOU DIDN'T HAVE MONEY TO GET MORE.: NEVER TRUE

## 2024-01-25 SDOH — SOCIAL STABILITY: SOCIAL NETWORK
HOW OFTEN DO YOU SEE OR TALK TO PEOPLE THAT YOU CARE ABOUT AND FEEL CLOSE TO? (FOR EXAMPLE: TALKING TO FRIENDS ON THE PHONE, VISITING FRIENDS OR FAMILY, GOING TO CHURCH OR CLUB MEETINGS): 5 OR MORE TIMES A WEEK

## 2024-01-25 SDOH — ECONOMIC STABILITY: INCOME INSECURITY: IN THE PAST 12 MONTHS, HAS THE ELECTRIC, GAS, OIL, OR WATER COMPANY THREATENED TO SHUT OFF SERVICE IN YOUR HOME?: NO

## 2024-01-25 SDOH — HEALTH STABILITY: GENERAL: BECAUSE OF A PHYSICAL, MENTAL, OR EMOTIONAL CONDITION, DO YOU HAVE DIFFICULTY DOING ERRANDS ALONE?: NO

## 2024-01-25 SDOH — ECONOMIC STABILITY: GENERAL: WOULD YOU LIKE HELP WITH ANY OF THE FOLLOWING NEEDS?: I DON'T WANT HELP WITH ANY OF THESE

## 2024-01-25 SDOH — SOCIAL STABILITY: SOCIAL INSECURITY: HOW OFTEN DOES ANYONE, INCLUDING FAMILY AND FRIENDS, THREATEN YOU WITH HARM?: PATIENT DECLINED

## 2024-01-25 SDOH — ECONOMIC STABILITY: HOUSING INSECURITY: DO YOU HAVE PROBLEMS WITH ANY OF THE FOLLOWING?: NONE OF THE ABOVE

## 2024-01-25 SDOH — SOCIAL STABILITY: SOCIAL INSECURITY: HOW OFTEN DOES ANYONE, INCLUDING FAMILY AND FRIENDS, INSULT OR TALK DOWN TO YOU?: PATIENT DECLINED

## 2024-01-25 SDOH — HEALTH STABILITY: GENERAL
BECAUSE OF A PHYSICAL, MENTAL, OR EMOTIONAL CONDITION, DO YOU HAVE SERIOUS DIFFICULTY CONCENTRATING, REMEMBERING OR MAKING DECISIONS?: NO

## 2024-01-25 SDOH — ECONOMIC STABILITY: GENERAL

## 2024-01-25 SDOH — HEALTH STABILITY: PHYSICAL HEALTH: DO YOU HAVE SERIOUS DIFFICULTY WALKING OR CLIMBING STAIRS?: NO

## 2024-01-25 SDOH — ECONOMIC STABILITY: TRANSPORTATION INSECURITY
IN THE PAST 12 MONTHS, HAS LACK OF RELIABLE TRANSPORTATION KEPT YOU FROM MEDICAL APPOINTMENTS, MEETINGS, WORK OR FROM GETTING THINGS NEEDED FOR DAILY LIVING?: NO

## 2024-01-25 SDOH — ECONOMIC STABILITY: HOUSING INSECURITY: WHAT IS YOUR LIVING SITUATION TODAY?: CONDO

## 2024-01-25 ASSESSMENT — ACTIVITIES OF DAILY LIVING (ADL)
ADL_SHORT_OF_BREATH: NO
ADL_BEFORE_ADMISSION: INDEPENDENT
ADL_SCORE: 12
RECENT_DECLINE_ADL: NO

## 2024-01-25 ASSESSMENT — PATIENT HEALTH QUESTIONNAIRE - PHQ9: IS PATIENT ABLE TO COMPLETE PHQ2 OR PHQ9: NO, DEFER TO LATER TIME

## 2024-01-25 ASSESSMENT — LIFESTYLE VARIABLES
HOW OFTEN DO YOU HAVE A DRINK CONTAINING ALCOHOL: NEVER
HOW OFTEN DO YOU HAVE 6 OR MORE DRINKS ON ONE OCCASION: NEVER
ALCOHOL_USE_STATUS: NO OR LOW RISK WITH VALIDATED TOOL
AUDIT-C TOTAL SCORE: 0
HOW MANY STANDARD DRINKS CONTAINING ALCOHOL DO YOU HAVE ON A TYPICAL DAY: 0,1 OR 2

## 2024-01-25 ASSESSMENT — PAIN SCALES - GENERAL
PAINLEVEL_OUTOF10: 0
PAINLEVEL_OUTOF10: 0
PAINLEVEL: 0

## 2024-01-26 ENCOUNTER — ANESTHESIA (OUTPATIENT)
Dept: OBGYN | Age: 44
End: 2024-01-26

## 2024-01-26 ENCOUNTER — ANESTHESIA EVENT (OUTPATIENT)
Dept: OBGYN | Age: 44
End: 2024-01-26

## 2024-01-26 PROCEDURE — 10002800 HB RX 250 W HCPCS: Performed by: ANESTHESIOLOGY

## 2024-01-26 PROCEDURE — 10002800 HB RX 250 W HCPCS

## 2024-01-26 PROCEDURE — 10004651 HB RX, NO CHARGE ITEM: Performed by: OBSTETRICS & GYNECOLOGY

## 2024-01-26 PROCEDURE — 10002801 HB RX 250 W/O HCPCS

## 2024-01-26 PROCEDURE — 10002807 HB RX 258: Performed by: OBSTETRICS & GYNECOLOGY

## 2024-01-26 PROCEDURE — 13000001 HB PHASE II RECOVERY EA 30 MINUTES: Performed by: OBSTETRICS & GYNECOLOGY

## 2024-01-26 PROCEDURE — 10002807 HB RX 258: Performed by: ANESTHESIOLOGY

## 2024-01-26 PROCEDURE — 10002801 HB RX 250 W/O HCPCS: Performed by: ANESTHESIOLOGY

## 2024-01-26 PROCEDURE — 13000036 HB COMPLEX  CASE S/U + 1ST 15 MIN: Performed by: OBSTETRICS & GYNECOLOGY

## 2024-01-26 PROCEDURE — 10002800 HB RX 250 W HCPCS: Performed by: STUDENT IN AN ORGANIZED HEALTH CARE EDUCATION/TRAINING PROGRAM

## 2024-01-26 PROCEDURE — 96372 THER/PROPH/DIAG INJ SC/IM: CPT

## 2024-01-26 PROCEDURE — 10000003 HB ROOM CHARGE WOMEN'S HEALTH

## 2024-01-26 PROCEDURE — 10002801 HB RX 250 W/O HCPCS: Performed by: STUDENT IN AN ORGANIZED HEALTH CARE EDUCATION/TRAINING PROGRAM

## 2024-01-26 PROCEDURE — 10H07YZ INSERTION OF OTHER DEVICE INTO PRODUCTS OF CONCEPTION, VIA NATURAL OR ARTIFICIAL OPENING: ICD-10-PCS | Performed by: OBSTETRICS & GYNECOLOGY

## 2024-01-26 PROCEDURE — 10006023 HB SUPPLY 272: Performed by: OBSTETRICS & GYNECOLOGY

## 2024-01-26 PROCEDURE — 10H073Z INSERTION OF MONITORING ELECTRODE INTO PRODUCTS OF CONCEPTION, VIA NATURAL OR ARTIFICIAL OPENING: ICD-10-PCS | Performed by: OBSTETRICS & GYNECOLOGY

## 2024-01-26 PROCEDURE — 10002803 HB RX 637: Performed by: OBSTETRICS & GYNECOLOGY

## 2024-01-26 PROCEDURE — 10002800 HB RX 250 W HCPCS: Performed by: OBSTETRICS & GYNECOLOGY

## 2024-01-26 PROCEDURE — 10002807 HB RX 258

## 2024-01-26 PROCEDURE — 13000012 HB ANESTHESIA SPINAL/EPIDURAL IN L&D: Performed by: OBSTETRICS & GYNECOLOGY

## 2024-01-26 PROCEDURE — 13001455 HB PERINATAL CARE HIGH RISK

## 2024-01-26 PROCEDURE — 13000037 HB COMPLEX CASE EACH ADD MINUTE: Performed by: OBSTETRICS & GYNECOLOGY

## 2024-01-26 RX ORDER — OXYTOCIN-SODIUM CHLORIDE 0.9% IV SOLN 30 UNIT/500ML 30-0.9/5 UT/ML-%
0-334 SOLUTION INTRAVENOUS CONTINUOUS
Status: CANCELLED | OUTPATIENT
Start: 2024-01-26

## 2024-01-26 RX ORDER — DIPHENHYDRAMINE HYDROCHLORIDE 50 MG/ML
25 INJECTION INTRAMUSCULAR; INTRAVENOUS ONCE
Status: COMPLETED | OUTPATIENT
Start: 2024-01-26 | End: 2024-01-26

## 2024-01-26 RX ORDER — LIDOCAINE HYDROCHLORIDE AND EPINEPHRINE 15; 5 MG/ML; UG/ML
INJECTION, SOLUTION EPIDURAL
Status: COMPLETED | OUTPATIENT
Start: 2024-01-26 | End: 2024-01-26

## 2024-01-26 RX ORDER — POLYETHYLENE GLYCOL 3350 17 G/17G
17 POWDER, FOR SOLUTION ORAL DAILY PRN
Status: DISCONTINUED | OUTPATIENT
Start: 2024-01-26 | End: 2024-01-29 | Stop reason: HOSPADM

## 2024-01-26 RX ORDER — ONDANSETRON 2 MG/ML
INJECTION INTRAMUSCULAR; INTRAVENOUS PRN
Status: DISCONTINUED | OUTPATIENT
Start: 2024-01-26 | End: 2024-01-26

## 2024-01-26 RX ORDER — SIMETHICONE 125 MG
125 TABLET,CHEWABLE ORAL 4 TIMES DAILY PRN
Status: DISCONTINUED | OUTPATIENT
Start: 2024-01-26 | End: 2024-01-29 | Stop reason: HOSPADM

## 2024-01-26 RX ORDER — TERBUTALINE SULFATE 1 MG/ML
0.25 INJECTION, SOLUTION SUBCUTANEOUS ONCE
Status: COMPLETED | OUTPATIENT
Start: 2024-01-26 | End: 2024-01-26

## 2024-01-26 RX ORDER — NALBUPHINE HYDROCHLORIDE 10 MG/ML
2.5 INJECTION, SOLUTION INTRAMUSCULAR; INTRAVENOUS; SUBCUTANEOUS
Status: DISCONTINUED | OUTPATIENT
Start: 2024-01-26 | End: 2024-01-26 | Stop reason: CLARIF

## 2024-01-26 RX ORDER — AMOXICILLIN 250 MG
2 CAPSULE ORAL DAILY PRN
Status: DISCONTINUED | OUTPATIENT
Start: 2024-01-26 | End: 2024-01-29 | Stop reason: HOSPADM

## 2024-01-26 RX ORDER — LIDOCAINE HYDROCHLORIDE 10 MG/ML
INJECTION, SOLUTION EPIDURAL; INFILTRATION; INTRACAUDAL; PERINEURAL
Status: COMPLETED | OUTPATIENT
Start: 2024-01-26 | End: 2024-01-26

## 2024-01-26 RX ORDER — OXYCODONE HYDROCHLORIDE 5 MG/1
5 TABLET ORAL EVERY 4 HOURS PRN
Status: DISCONTINUED | OUTPATIENT
Start: 2024-01-26 | End: 2024-01-29 | Stop reason: HOSPADM

## 2024-01-26 RX ORDER — FENTANYL/ROPIVACAINE/NS/PF 2MCG/ML-.1
PLASTIC BAG, INJECTION (ML) EPIDURAL CONTINUOUS
Status: DISCONTINUED | OUTPATIENT
Start: 2024-01-26 | End: 2024-01-26

## 2024-01-26 RX ORDER — SODIUM CHLORIDE 9 MG/ML
INJECTION, SOLUTION INTRAVENOUS CONTINUOUS
Status: DISCONTINUED | OUTPATIENT
Start: 2024-01-26 | End: 2024-01-26

## 2024-01-26 RX ORDER — ACETAMINOPHEN 325 MG/1
650 TABLET ORAL EVERY 6 HOURS SCHEDULED
Status: DISCONTINUED | OUTPATIENT
Start: 2024-01-26 | End: 2024-01-29 | Stop reason: HOSPADM

## 2024-01-26 RX ORDER — KETOROLAC TROMETHAMINE 15 MG/ML
15 INJECTION, SOLUTION INTRAMUSCULAR; INTRAVENOUS EVERY 6 HOURS
Status: DISPENSED | OUTPATIENT
Start: 2024-01-26 | End: 2024-01-27

## 2024-01-26 RX ORDER — ONDANSETRON 2 MG/ML
4 INJECTION INTRAMUSCULAR; INTRAVENOUS 2 TIMES DAILY PRN
Status: ACTIVE | OUTPATIENT
Start: 2024-01-26 | End: 2024-01-28

## 2024-01-26 RX ORDER — GLYCOPYRROLATE 0.2 MG/ML
INJECTION, SOLUTION INTRAMUSCULAR; INTRAVENOUS PRN
Status: DISCONTINUED | OUTPATIENT
Start: 2024-01-26 | End: 2024-01-26

## 2024-01-26 RX ORDER — EPHEDRINE SULFATE/0.9% NACL/PF 50 MG/10ML
5 SYRINGE (ML) INTRAVENOUS
Status: DISCONTINUED | OUTPATIENT
Start: 2024-01-26 | End: 2024-01-26 | Stop reason: CLARIF

## 2024-01-26 RX ORDER — ACETAMINOPHEN 650 MG/1
650 SUPPOSITORY RECTAL
Status: DISCONTINUED | OUTPATIENT
Start: 2024-01-26 | End: 2024-01-26

## 2024-01-26 RX ORDER — NALBUPHINE HYDROCHLORIDE 10 MG/ML
2.5 INJECTION, SOLUTION INTRAMUSCULAR; INTRAVENOUS; SUBCUTANEOUS EVERY 8 HOURS PRN
Status: DISCONTINUED | OUTPATIENT
Start: 2024-01-26 | End: 2024-01-26

## 2024-01-26 RX ORDER — HYDROMORPHONE HCL IN 0.9% NACL 0.2 MG/ML
PLASTIC BAG, INJECTION (ML) INTRAVENOUS CONTINUOUS
Status: DISCONTINUED | OUTPATIENT
Start: 2024-01-26 | End: 2024-01-26

## 2024-01-26 RX ORDER — TRANEXAMIC ACID 10 MG/ML
1000 INJECTION, SOLUTION INTRAVENOUS ONCE
Status: COMPLETED | OUTPATIENT
Start: 2024-01-26 | End: 2024-01-26

## 2024-01-26 RX ORDER — PROCHLORPERAZINE EDISYLATE 5 MG/ML
5 INJECTION INTRAMUSCULAR; INTRAVENOUS EVERY 4 HOURS PRN
Status: DISCONTINUED | OUTPATIENT
Start: 2024-01-26 | End: 2024-01-29 | Stop reason: HOSPADM

## 2024-01-26 RX ORDER — SODIUM CHLORIDE, SODIUM LACTATE, POTASSIUM CHLORIDE, CALCIUM CHLORIDE 600; 310; 30; 20 MG/100ML; MG/100ML; MG/100ML; MG/100ML
INJECTION, SOLUTION INTRAVENOUS CONTINUOUS
Status: DISCONTINUED | OUTPATIENT
Start: 2024-01-26 | End: 2024-01-29 | Stop reason: HOSPADM

## 2024-01-26 RX ORDER — PROCHLORPERAZINE MALEATE 10 MG
5 TABLET ORAL EVERY 4 HOURS PRN
Status: DISCONTINUED | OUTPATIENT
Start: 2024-01-26 | End: 2024-01-29 | Stop reason: HOSPADM

## 2024-01-26 RX ORDER — TRANEXAMIC ACID 10 MG/ML
INJECTION, SOLUTION INTRAVENOUS
Status: COMPLETED
Start: 2024-01-26 | End: 2024-01-26

## 2024-01-26 RX ORDER — EPHEDRINE SULFATE/0.9% NACL/PF 50 MG/10ML
10 SYRINGE (ML) INTRAVENOUS
Status: DISCONTINUED | OUTPATIENT
Start: 2024-01-26 | End: 2024-01-26 | Stop reason: CLARIF

## 2024-01-26 RX ORDER — FERROUS SULFATE 325(65) MG
325 TABLET ORAL
Status: DISCONTINUED | OUTPATIENT
Start: 2024-01-27 | End: 2024-01-29 | Stop reason: HOSPADM

## 2024-01-26 RX ORDER — BUPIVACAINE HYDROCHLORIDE 2.5 MG/ML
INJECTION, SOLUTION EPIDURAL; INFILTRATION; INTRACAUDAL
Status: COMPLETED | OUTPATIENT
Start: 2024-01-26 | End: 2024-01-26

## 2024-01-26 RX ORDER — FAMOTIDINE 10 MG/ML
INJECTION, SOLUTION INTRAVENOUS PRN
Status: DISCONTINUED | OUTPATIENT
Start: 2024-01-26 | End: 2024-01-26

## 2024-01-26 RX ORDER — SODIUM CHLORIDE 9 MG/ML
INJECTION, SOLUTION INTRAVENOUS CONTINUOUS
Status: DISCONTINUED | OUTPATIENT
Start: 2024-01-26 | End: 2024-01-29 | Stop reason: HOSPADM

## 2024-01-26 RX ORDER — CALCIUM CARBONATE 500 MG/1
500 TABLET, CHEWABLE ORAL EVERY 4 HOURS PRN
Status: DISCONTINUED | OUTPATIENT
Start: 2024-01-26 | End: 2024-01-29 | Stop reason: HOSPADM

## 2024-01-26 RX ORDER — NALOXONE HCL 0.4 MG/ML
0.1 VIAL (ML) INJECTION PRN
Status: DISCONTINUED | OUTPATIENT
Start: 2024-01-26 | End: 2024-01-26

## 2024-01-26 RX ORDER — NALOXONE HCL 0.4 MG/ML
0.1 VIAL (ML) INJECTION PRN
Status: DISCONTINUED | OUTPATIENT
Start: 2024-01-26 | End: 2024-01-29 | Stop reason: HOSPADM

## 2024-01-26 RX ORDER — CITRIC ACID/SODIUM CITRATE 334-500MG
30 SOLUTION, ORAL ORAL
Status: COMPLETED | OUTPATIENT
Start: 2024-01-26 | End: 2024-01-26

## 2024-01-26 RX ORDER — NALBUPHINE HYDROCHLORIDE 10 MG/ML
2.5 INJECTION, SOLUTION INTRAMUSCULAR; INTRAVENOUS; SUBCUTANEOUS EVERY 8 HOURS PRN
Status: DISCONTINUED | OUTPATIENT
Start: 2024-01-26 | End: 2024-01-29 | Stop reason: HOSPADM

## 2024-01-26 RX ORDER — HYDROCORTISONE ACETATE PRAMOXINE HCL 2.5; 1 G/100G; G/100G
1 CREAM TOPICAL 3 TIMES DAILY PRN
Status: DISCONTINUED | OUTPATIENT
Start: 2024-01-26 | End: 2024-01-29 | Stop reason: HOSPADM

## 2024-01-26 RX ORDER — OXYCODONE HYDROCHLORIDE 5 MG/1
5 TABLET ORAL EVERY 4 HOURS PRN
Status: DISCONTINUED | OUTPATIENT
Start: 2024-01-26 | End: 2024-01-26 | Stop reason: ALTCHOICE

## 2024-01-26 RX ORDER — TERBUTALINE SULFATE 1 MG/ML
INJECTION, SOLUTION SUBCUTANEOUS
Status: DISCONTINUED
Start: 2024-01-26 | End: 2024-01-26 | Stop reason: WASHOUT

## 2024-01-26 RX ORDER — IBUPROFEN 600 MG/1
600 TABLET ORAL EVERY 6 HOURS SCHEDULED
Status: DISCONTINUED | OUTPATIENT
Start: 2024-01-27 | End: 2024-01-29 | Stop reason: HOSPADM

## 2024-01-26 RX ORDER — HYDROMORPHONE HCL IN 0.9% NACL 0.2 MG/ML
PLASTIC BAG, INJECTION (ML) INTRAVENOUS CONTINUOUS
Status: DISCONTINUED | OUTPATIENT
Start: 2024-01-26 | End: 2024-01-26 | Stop reason: CLARIF

## 2024-01-26 RX ORDER — LIDOCAINE HCL/EPINEPHRINE/PF 2%-1:200K
VIAL (ML) INJECTION PRN
Status: DISCONTINUED | OUTPATIENT
Start: 2024-01-26 | End: 2024-01-26

## 2024-01-26 RX ORDER — TERBUTALINE SULFATE 1 MG/ML
INJECTION, SOLUTION SUBCUTANEOUS
Status: COMPLETED
Start: 2024-01-26 | End: 2024-01-26

## 2024-01-26 RX ADMIN — TRANEXAMIC ACID 1000 MG: 10 INJECTION, SOLUTION INTRAVENOUS at 14:25

## 2024-01-26 RX ADMIN — SODIUM CHLORIDE, POTASSIUM CHLORIDE, SODIUM LACTATE AND CALCIUM CHLORIDE: 600; 310; 30; 20 INJECTION, SOLUTION INTRAVENOUS at 03:30

## 2024-01-26 RX ADMIN — LIDOCAINE HYDROCHLORIDE 3 ML: 10 INJECTION, SOLUTION EPIDURAL; INFILTRATION; INTRACAUDAL; PERINEURAL at 03:28

## 2024-01-26 RX ADMIN — FENTANYL CITRATE 100 MCG: 50 INJECTION INTRAMUSCULAR; INTRAVENOUS at 13:58

## 2024-01-26 RX ADMIN — LIDOCAINE HYDROCHLORIDE,EPINEPHRINE BITARTRATE 3 ML: 15; .005 INJECTION, SOLUTION EPIDURAL; INFILTRATION; INTRACAUDAL; PERINEURAL at 03:28

## 2024-01-26 RX ADMIN — Medication 200 MCG: at 14:27

## 2024-01-26 RX ADMIN — ONDANSETRON 4 MG: 2 INJECTION INTRAMUSCULAR; INTRAVENOUS at 14:02

## 2024-01-26 RX ADMIN — SODIUM CHLORIDE, POTASSIUM CHLORIDE, SODIUM LACTATE AND CALCIUM CHLORIDE: 600; 310; 30; 20 INJECTION, SOLUTION INTRAVENOUS at 05:45

## 2024-01-26 RX ADMIN — TERBUTALINE SULFATE 0.25 MG: 1 INJECTION, SOLUTION SUBCUTANEOUS at 02:16

## 2024-01-26 RX ADMIN — AZITHROMYCIN MONOHYDRATE 500 MG: 500 INJECTION, POWDER, LYOPHILIZED, FOR SOLUTION INTRAVENOUS at 14:04

## 2024-01-26 RX ADMIN — CEFAZOLIN SODIUM 2000 MG: 300 INJECTION, POWDER, LYOPHILIZED, FOR SOLUTION INTRAVENOUS at 14:04

## 2024-01-26 RX ADMIN — KETOROLAC TROMETHAMINE 30 MG: 30 INJECTION, SOLUTION INTRAMUSCULAR at 15:38

## 2024-01-26 RX ADMIN — LIDOCAINE HYDROCHLORIDE,EPINEPHRINE BITARTRATE 12 ML: 20; .005 INJECTION, SOLUTION EPIDURAL; INFILTRATION; INTRACAUDAL; PERINEURAL at 13:58

## 2024-01-26 RX ADMIN — ACETAMINOPHEN 650 MG: 325 TABLET ORAL at 21:03

## 2024-01-26 RX ADMIN — TERBUTALINE SULFATE 0.25 MG: 1 INJECTION, SOLUTION SUBCUTANEOUS at 02:30

## 2024-01-26 RX ADMIN — PHENYLEPHRINE HYDROCHLORIDE 30 MCG/MIN: 10 INJECTION INTRAVENOUS at 14:25

## 2024-01-26 RX ADMIN — Medication 2 MILLI-UNITS/MIN: at 02:00

## 2024-01-26 RX ADMIN — KETOROLAC TROMETHAMINE 15 MG: 15 INJECTION, SOLUTION INTRAMUSCULAR; INTRAVENOUS at 22:29

## 2024-01-26 RX ADMIN — GLYCOPYRROLATE 0.2 MG: 0.2 INJECTION INTRAMUSCULAR; INTRAVENOUS at 04:09

## 2024-01-26 RX ADMIN — Medication 12 ML/HR: at 03:31

## 2024-01-26 RX ADMIN — SODIUM CHLORIDE, POTASSIUM CHLORIDE, SODIUM LACTATE AND CALCIUM CHLORIDE 30 ML/HR: 600; 310; 30; 20 INJECTION, SOLUTION INTRAVENOUS at 18:51

## 2024-01-26 RX ADMIN — ACETAMINOPHEN 650 MG: 650 SUPPOSITORY RECTAL at 14:01

## 2024-01-26 RX ADMIN — SODIUM CHLORIDE, POTASSIUM CHLORIDE, SODIUM LACTATE AND CALCIUM CHLORIDE: 600; 310; 30; 20 INJECTION, SOLUTION INTRAVENOUS at 02:00

## 2024-01-26 RX ADMIN — FAMOTIDINE 20 MG: 10 INJECTION INTRAVENOUS at 14:02

## 2024-01-26 RX ADMIN — Medication 2 MILLI-UNITS/MIN: at 09:27

## 2024-01-26 RX ADMIN — SODIUM CHLORIDE, POTASSIUM CHLORIDE, SODIUM LACTATE AND CALCIUM CHLORIDE: 600; 310; 30; 20 INJECTION, SOLUTION INTRAVENOUS at 12:57

## 2024-01-26 RX ADMIN — OXYTOCIN 500 MILLI-UNITS/MIN: 10 INJECTION, SOLUTION INTRAMUSCULAR; INTRAVENOUS at 14:22

## 2024-01-26 RX ADMIN — OXYCODONE HYDROCHLORIDE 5 MG: 5 TABLET ORAL at 18:10

## 2024-01-26 RX ADMIN — DIPHENHYDRAMINE HYDROCHLORIDE 25 MG: 50 INJECTION, SOLUTION INTRAMUSCULAR; INTRAVENOUS at 02:16

## 2024-01-26 RX ADMIN — Medication 2 MILLI-UNITS/MIN: at 09:58

## 2024-01-26 RX ADMIN — DIPHENHYDRAMINE HYDROCHLORIDE 25 MG: 50 INJECTION, SOLUTION INTRAMUSCULAR; INTRAVENOUS at 10:20

## 2024-01-26 RX ADMIN — Medication 200 MCG: at 04:07

## 2024-01-26 RX ADMIN — BUPIVACAINE HYDROCHLORIDE 1 ML: 2.5 INJECTION, SOLUTION EPIDURAL; INFILTRATION; INTRACAUDAL at 03:28

## 2024-01-26 RX ADMIN — Medication 100 MCG: at 04:11

## 2024-01-26 RX ADMIN — SODIUM CITRATE AND CITRIC ACID MONOHYDRATE 30 ML: 500; 334 SOLUTION ORAL at 13:51

## 2024-01-26 SDOH — SOCIAL STABILITY: SOCIAL INSECURITY: RISK FACTORS: VASCULAR DISEASE

## 2024-01-26 SDOH — SOCIAL STABILITY: SOCIAL INSECURITY: RISK FACTORS: BMI> 30 (OBESITY)

## 2024-01-26 ASSESSMENT — PAIN SCALES - GENERAL
PAINLEVEL_OUTOF10: 7
PAINLEVEL_OUTOF10: 2
PAINLEVEL_OUTOF10: 4
PAINLEVEL_OUTOF10: 2
PAINLEVEL_OUTOF10: 3
PAINLEVEL_OUTOF10: 3
PAINLEVEL_OUTOF10: 0
PAINLEVEL_OUTOF10: 7
PAINLEVEL_OUTOF10: 2
PAINLEVEL_OUTOF10: 3
PAINLEVEL_OUTOF10: 8

## 2024-01-26 ASSESSMENT — ENCOUNTER SYMPTOMS: EXERCISE TOLERANCE: GOOD (>4 METS)

## 2024-01-27 LAB
DEPRECATED RDW RBC: 50.4 FL (ref 39–50)
ERYTHROCYTE [DISTWIDTH] IN BLOOD: 14.1 % (ref 11–15)
HCT VFR BLD CALC: 32.8 % (ref 36–46.5)
HGB BLD-MCNC: 10.8 G/DL (ref 12–15.5)
MCH RBC QN AUTO: 32 PG (ref 26–34)
MCHC RBC AUTO-ENTMCNC: 32.9 G/DL (ref 32–36.5)
MCV RBC AUTO: 97.3 FL (ref 78–100)
NRBC BLD MANUAL-RTO: 0 /100 WBC
PLATELET # BLD AUTO: 143 K/MCL (ref 140–450)
RBC # BLD: 3.37 MIL/MCL (ref 4–5.2)
WBC # BLD: 20.5 K/MCL (ref 4.2–11)

## 2024-01-27 PROCEDURE — 10004651 HB RX, NO CHARGE ITEM: Performed by: OBSTETRICS & GYNECOLOGY

## 2024-01-27 PROCEDURE — 10002800 HB RX 250 W HCPCS: Performed by: OBSTETRICS & GYNECOLOGY

## 2024-01-27 PROCEDURE — 36415 COLL VENOUS BLD VENIPUNCTURE: CPT | Performed by: OBSTETRICS & GYNECOLOGY

## 2024-01-27 PROCEDURE — 99232 SBSQ HOSP IP/OBS MODERATE 35: CPT

## 2024-01-27 PROCEDURE — 85027 COMPLETE CBC AUTOMATED: CPT | Performed by: OBSTETRICS & GYNECOLOGY

## 2024-01-27 PROCEDURE — 10002803 HB RX 637: Performed by: OBSTETRICS & GYNECOLOGY

## 2024-01-27 PROCEDURE — 10000003 HB ROOM CHARGE WOMEN'S HEALTH

## 2024-01-27 PROCEDURE — 10002807 HB RX 258: Performed by: OBSTETRICS & GYNECOLOGY

## 2024-01-27 RX ADMIN — ACETAMINOPHEN 650 MG: 325 TABLET ORAL at 15:47

## 2024-01-27 RX ADMIN — SODIUM CHLORIDE, POTASSIUM CHLORIDE, SODIUM LACTATE AND CALCIUM CHLORIDE: 600; 310; 30; 20 INJECTION, SOLUTION INTRAVENOUS at 03:29

## 2024-01-27 RX ADMIN — ACETAMINOPHEN 650 MG: 325 TABLET ORAL at 21:09

## 2024-01-27 RX ADMIN — IBUPROFEN 600 MG: 600 TABLET, FILM COATED ORAL at 18:32

## 2024-01-27 RX ADMIN — ACETAMINOPHEN 650 MG: 325 TABLET ORAL at 09:07

## 2024-01-27 RX ADMIN — ACETAMINOPHEN 650 MG: 325 TABLET ORAL at 03:11

## 2024-01-27 RX ADMIN — KETOROLAC TROMETHAMINE 15 MG: 15 INJECTION, SOLUTION INTRAMUSCULAR; INTRAVENOUS at 05:08

## 2024-01-27 RX ADMIN — IBUPROFEN 600 MG: 600 TABLET, FILM COATED ORAL at 12:13

## 2024-01-27 RX ADMIN — FERROUS SULFATE TAB 325 MG (65 MG ELEMENTAL FE) 325 MG: 325 (65 FE) TAB at 09:08

## 2024-01-27 ASSESSMENT — PAIN SCALES - GENERAL
PAINLEVEL_OUTOF10: 7
PAINLEVEL_OUTOF10: 5
PAINLEVEL_OUTOF10: 5
PAINLEVEL_OUTOF10: 4
PAINLEVEL_OUTOF10: 6
PAINLEVEL_OUTOF10: 6
PAINLEVEL_OUTOF10: 2
PAINLEVEL_OUTOF10: 4

## 2024-01-28 VITALS
HEART RATE: 80 BPM | SYSTOLIC BLOOD PRESSURE: 128 MMHG | OXYGEN SATURATION: 97 % | RESPIRATION RATE: 15 BRPM | BODY MASS INDEX: 37.61 KG/M2 | HEIGHT: 64 IN | TEMPERATURE: 98.1 F | WEIGHT: 220.3 LBS | DIASTOLIC BLOOD PRESSURE: 80 MMHG

## 2024-01-28 PROCEDURE — 10002803 HB RX 637: Performed by: OBSTETRICS & GYNECOLOGY

## 2024-01-28 PROCEDURE — 10004651 HB RX, NO CHARGE ITEM: Performed by: OBSTETRICS & GYNECOLOGY

## 2024-01-28 PROCEDURE — 10000003 HB ROOM CHARGE WOMEN'S HEALTH

## 2024-01-28 RX ADMIN — IBUPROFEN 600 MG: 600 TABLET, FILM COATED ORAL at 06:18

## 2024-01-28 RX ADMIN — IBUPROFEN 600 MG: 600 TABLET, FILM COATED ORAL at 18:05

## 2024-01-28 RX ADMIN — FERROUS SULFATE TAB 325 MG (65 MG ELEMENTAL FE) 325 MG: 325 (65 FE) TAB at 09:45

## 2024-01-28 RX ADMIN — ACETAMINOPHEN 650 MG: 325 TABLET ORAL at 15:18

## 2024-01-28 RX ADMIN — ACETAMINOPHEN 650 MG: 325 TABLET ORAL at 20:48

## 2024-01-28 RX ADMIN — IBUPROFEN 600 MG: 600 TABLET, FILM COATED ORAL at 00:01

## 2024-01-28 RX ADMIN — ACETAMINOPHEN 650 MG: 325 TABLET ORAL at 09:44

## 2024-01-28 RX ADMIN — IBUPROFEN 600 MG: 600 TABLET, FILM COATED ORAL at 12:22

## 2024-01-28 RX ADMIN — IBUPROFEN 600 MG: 600 TABLET, FILM COATED ORAL at 23:53

## 2024-01-28 RX ADMIN — SENNOSIDES AND DOCUSATE SODIUM 2 TABLET: 50; 8.6 TABLET ORAL at 09:44

## 2024-01-28 RX ADMIN — ACETAMINOPHEN 650 MG: 325 TABLET ORAL at 03:59

## 2024-01-28 ASSESSMENT — PAIN SCALES - GENERAL
PAINLEVEL_OUTOF10: 2
PAINLEVEL_OUTOF10: 3
PAINLEVEL_OUTOF10: 3
PAINLEVEL_OUTOF10: 4
PAINLEVEL_OUTOF10: 3
PAINLEVEL_OUTOF10: 4
PAINLEVEL_OUTOF10: 3
PAINLEVEL_OUTOF10: 3

## 2024-01-29 VITALS
TEMPERATURE: 98.2 F | DIASTOLIC BLOOD PRESSURE: 86 MMHG | WEIGHT: 220.3 LBS | BODY MASS INDEX: 37.61 KG/M2 | SYSTOLIC BLOOD PRESSURE: 141 MMHG | OXYGEN SATURATION: 98 % | RESPIRATION RATE: 14 BRPM | HEART RATE: 101 BPM | HEIGHT: 64 IN

## 2024-01-29 PROBLEM — O13.3 GESTATIONAL HYPERTENSION, THIRD TRIMESTER: Status: ACTIVE | Noted: 2024-01-29

## 2024-01-29 PROCEDURE — 10002803 HB RX 637: Performed by: OBSTETRICS & GYNECOLOGY

## 2024-01-29 PROCEDURE — 10004651 HB RX, NO CHARGE ITEM: Performed by: OBSTETRICS & GYNECOLOGY

## 2024-01-29 PROCEDURE — 99024 POSTOP FOLLOW-UP VISIT: CPT

## 2024-01-29 RX ORDER — OXYCODONE HYDROCHLORIDE 5 MG/1
5 TABLET ORAL EVERY 8 HOURS PRN
Qty: 5 TABLET | Refills: 0 | Status: SHIPPED | OUTPATIENT
Start: 2024-01-29

## 2024-01-29 RX ORDER — POLYETHYLENE GLYCOL 3350 17 G/17G
17 POWDER, FOR SOLUTION ORAL DAILY PRN
Qty: 30 EACH | Refills: 0 | Status: SHIPPED | OUTPATIENT
Start: 2024-01-29

## 2024-01-29 RX ORDER — FERROUS SULFATE 325(65) MG
325 TABLET ORAL
Qty: 30 TABLET | Refills: 3 | Status: SHIPPED | OUTPATIENT
Start: 2024-01-30

## 2024-01-29 RX ORDER — PSEUDOEPHED/ACETAMINOPH/DIPHEN 30MG-500MG
1000 TABLET ORAL EVERY 6 HOURS SCHEDULED
Qty: 60 TABLET | Refills: 0 | Status: SHIPPED | OUTPATIENT
Start: 2024-01-29

## 2024-01-29 RX ORDER — IBUPROFEN 600 MG/1
600 TABLET ORAL EVERY 6 HOURS SCHEDULED
Qty: 30 TABLET | Refills: 0 | Status: SHIPPED | OUTPATIENT
Start: 2024-01-29

## 2024-01-29 RX ADMIN — IBUPROFEN 600 MG: 600 TABLET, FILM COATED ORAL at 12:38

## 2024-01-29 RX ADMIN — SENNOSIDES AND DOCUSATE SODIUM 2 TABLET: 50; 8.6 TABLET ORAL at 08:49

## 2024-01-29 RX ADMIN — ACETAMINOPHEN 650 MG: 325 TABLET ORAL at 08:48

## 2024-01-29 RX ADMIN — FERROUS SULFATE TAB 325 MG (65 MG ELEMENTAL FE) 325 MG: 325 (65 FE) TAB at 08:48

## 2024-01-29 RX ADMIN — ACETAMINOPHEN 650 MG: 325 TABLET ORAL at 02:53

## 2024-01-29 RX ADMIN — IBUPROFEN 600 MG: 600 TABLET, FILM COATED ORAL at 06:27

## 2024-01-29 RX ADMIN — ACETAMINOPHEN 650 MG: 325 TABLET ORAL at 15:31

## 2024-01-29 ASSESSMENT — PAIN SCALES - GENERAL
PAINLEVEL_OUTOF10: 3

## 2024-02-02 ENCOUNTER — HOSPITAL ENCOUNTER (OUTPATIENT)
Age: 44
Setting detail: OBSERVATION
Discharge: HOME | End: 2024-02-02
Attending: STUDENT IN AN ORGANIZED HEALTH CARE EDUCATION/TRAINING PROGRAM | Admitting: STUDENT IN AN ORGANIZED HEALTH CARE EDUCATION/TRAINING PROGRAM

## 2024-02-02 ENCOUNTER — NURSE ONLY (OUTPATIENT)
Dept: OBGYN | Age: 44
End: 2024-02-02

## 2024-02-02 VITALS
RESPIRATION RATE: 18 BRPM | SYSTOLIC BLOOD PRESSURE: 136 MMHG | HEART RATE: 66 BPM | DIASTOLIC BLOOD PRESSURE: 74 MMHG | BODY MASS INDEX: 35 KG/M2 | TEMPERATURE: 98.6 F | HEIGHT: 64 IN | WEIGHT: 205 LBS

## 2024-02-02 VITALS — DIASTOLIC BLOOD PRESSURE: 93 MMHG | HEART RATE: 65 BPM | SYSTOLIC BLOOD PRESSURE: 148 MMHG

## 2024-02-02 DIAGNOSIS — R03.0 ELEVATED BP WITHOUT DIAGNOSIS OF HYPERTENSION: ICD-10-CM

## 2024-02-02 DIAGNOSIS — I10 HYPERTENSION, UNSPECIFIED TYPE: Primary | ICD-10-CM

## 2024-02-02 LAB
ALBUMIN SERPL-MCNC: 2.6 G/DL (ref 3.6–5.1)
ALBUMIN/GLOB SERPL: 0.6 {RATIO} (ref 1–2.4)
ALP SERPL-CCNC: 79 UNITS/L (ref 45–117)
ALT SERPL-CCNC: 42 UNITS/L
ANION GAP SERPL CALC-SCNC: 11 MMOL/L (ref 7–19)
AST SERPL-CCNC: 33 UNITS/L
BASOPHILS # BLD: 0.1 K/MCL (ref 0–0.3)
BASOPHILS NFR BLD: 1 %
BILIRUB SERPL-MCNC: 0.3 MG/DL (ref 0.2–1)
BUN SERPL-MCNC: 9 MG/DL (ref 6–20)
BUN/CREAT SERPL: 14 (ref 7–25)
CALCIUM SERPL-MCNC: 8.3 MG/DL (ref 8.4–10.2)
CHLORIDE SERPL-SCNC: 106 MMOL/L (ref 97–110)
CO2 SERPL-SCNC: 27 MMOL/L (ref 21–32)
CREAT SERPL-MCNC: 0.66 MG/DL (ref 0.51–0.95)
DEPRECATED RDW RBC: 48.3 FL (ref 39–50)
EGFRCR SERPLBLD CKD-EPI 2021: >90 ML/MIN/{1.73_M2}
EOSINOPHIL # BLD: 0.2 K/MCL (ref 0–0.5)
EOSINOPHIL NFR BLD: 2 %
ERYTHROCYTE [DISTWIDTH] IN BLOOD: 13.6 % (ref 11–15)
FASTING DURATION TIME PATIENT: ABNORMAL H
GLOBULIN SER-MCNC: 4.1 G/DL (ref 2–4)
GLUCOSE SERPL-MCNC: 88 MG/DL (ref 70–99)
HCT VFR BLD CALC: 36.6 % (ref 36–46.5)
HGB BLD-MCNC: 12.1 G/DL (ref 12–15.5)
IMM GRANULOCYTES # BLD AUTO: 0.3 K/MCL (ref 0–0.2)
IMM GRANULOCYTES # BLD: 3 %
LDH SERPL L TO P-CCNC: 230 UNITS/L (ref 82–240)
LYMPHOCYTES # BLD: 2.6 K/MCL (ref 1–4.8)
LYMPHOCYTES NFR BLD: 20 %
MCH RBC QN AUTO: 32 PG (ref 26–34)
MCHC RBC AUTO-ENTMCNC: 33.1 G/DL (ref 32–36.5)
MCV RBC AUTO: 96.8 FL (ref 78–100)
MONOCYTES # BLD: 0.7 K/MCL (ref 0.3–0.9)
MONOCYTES NFR BLD: 5 %
NEUTROPHILS # BLD: 9 K/MCL (ref 1.8–7.7)
NEUTROPHILS NFR BLD: 69 %
NRBC BLD MANUAL-RTO: 0 /100 WBC
PLATELET # BLD AUTO: 290 K/MCL (ref 140–450)
POTASSIUM SERPL-SCNC: 4.2 MMOL/L (ref 3.4–5.1)
PROT SERPL-MCNC: 6.7 G/DL (ref 6.4–8.2)
RBC # BLD: 3.78 MIL/MCL (ref 4–5.2)
SODIUM SERPL-SCNC: 140 MMOL/L (ref 135–145)
URATE SERPL-MCNC: 4.3 MG/DL (ref 2.6–5.9)
WBC # BLD: 12.9 K/MCL (ref 4.2–11)

## 2024-02-02 PROCEDURE — 10002803 HB RX 637: Performed by: MIDWIFE

## 2024-02-02 PROCEDURE — 99212 OFFICE O/P EST SF 10 MIN: CPT

## 2024-02-02 PROCEDURE — 84550 ASSAY OF BLOOD/URIC ACID: CPT | Performed by: MIDWIFE

## 2024-02-02 PROCEDURE — 80053 COMPREHEN METABOLIC PANEL: CPT

## 2024-02-02 PROCEDURE — G0378 HOSPITAL OBSERVATION PER HR: HCPCS

## 2024-02-02 PROCEDURE — 85025 COMPLETE CBC W/AUTO DIFF WBC: CPT

## 2024-02-02 PROCEDURE — 83615 LACTATE (LD) (LDH) ENZYME: CPT | Performed by: MIDWIFE

## 2024-02-02 RX ORDER — NIFEDIPINE 30 MG/1
30 TABLET, EXTENDED RELEASE ORAL DAILY
Qty: 30 TABLET | Refills: 0 | Status: SHIPPED | OUTPATIENT
Start: 2024-02-03

## 2024-02-02 RX ORDER — ACETAMINOPHEN 500 MG
1000 TABLET ORAL EVERY 6 HOURS PRN
Status: DISCONTINUED | OUTPATIENT
Start: 2024-02-02 | End: 2024-02-02 | Stop reason: HOSPADM

## 2024-02-02 RX ORDER — NIFEDIPINE 30 MG/1
30 TABLET, EXTENDED RELEASE ORAL ONCE
Status: COMPLETED | OUTPATIENT
Start: 2024-02-02 | End: 2024-02-02

## 2024-02-02 RX ORDER — IBUPROFEN 600 MG/1
600 TABLET ORAL EVERY 6 HOURS PRN
Status: DISCONTINUED | OUTPATIENT
Start: 2024-02-02 | End: 2024-02-02 | Stop reason: HOSPADM

## 2024-02-02 RX ORDER — NIFEDIPINE 30 MG/1
30 TABLET, EXTENDED RELEASE ORAL DAILY
Status: DISCONTINUED | OUTPATIENT
Start: 2024-02-03 | End: 2024-02-02 | Stop reason: HOSPADM

## 2024-02-02 RX ADMIN — IBUPROFEN 600 MG: 600 TABLET, FILM COATED ORAL at 13:16

## 2024-02-02 RX ADMIN — NIFEDIPINE 30 MG: 30 TABLET, FILM COATED, EXTENDED RELEASE ORAL at 13:16

## 2024-02-02 SDOH — ECONOMIC STABILITY: FOOD INSECURITY: WITHIN THE PAST 12 MONTHS, THE FOOD YOU BOUGHT JUST DIDN'T LAST AND YOU DIDN'T HAVE MONEY TO GET MORE.: NEVER TRUE

## 2024-02-09 ENCOUNTER — APPOINTMENT (OUTPATIENT)
Dept: OBGYN | Age: 44
End: 2024-02-09

## 2024-02-09 VITALS
BODY MASS INDEX: 34.76 KG/M2 | HEIGHT: 64 IN | RESPIRATION RATE: 16 BRPM | TEMPERATURE: 97.9 F | SYSTOLIC BLOOD PRESSURE: 137 MMHG | WEIGHT: 203.59 LBS | DIASTOLIC BLOOD PRESSURE: 79 MMHG | HEART RATE: 84 BPM

## 2024-02-09 DIAGNOSIS — Z09 POSTOP CHECK: Primary | ICD-10-CM

## 2024-02-09 PROBLEM — O09.511 PRIMIGRAVIDA OF ADVANCED MATERNAL AGE IN FIRST TRIMESTER: Status: RESOLVED | Noted: 2023-06-29 | Resolved: 2024-01-26

## 2024-02-09 PROCEDURE — 99024 POSTOP FOLLOW-UP VISIT: CPT | Performed by: OBSTETRICS & GYNECOLOGY

## 2024-02-29 ENCOUNTER — E-ADVICE (OUTPATIENT)
Dept: OBGYN | Age: 44
End: 2024-02-29

## 2024-03-06 ENCOUNTER — APPOINTMENT (OUTPATIENT)
Dept: OBGYN | Age: 44
End: 2024-03-06

## 2024-03-06 VITALS
WEIGHT: 199.89 LBS | SYSTOLIC BLOOD PRESSURE: 142 MMHG | HEART RATE: 68 BPM | BODY MASS INDEX: 34.13 KG/M2 | TEMPERATURE: 98.2 F | DIASTOLIC BLOOD PRESSURE: 90 MMHG | HEIGHT: 64 IN

## 2024-03-06 DIAGNOSIS — Z30.011 ENCOUNTER FOR INITIAL PRESCRIPTION OF CONTRACEPTIVE PILLS: Primary | ICD-10-CM

## 2024-03-06 RX ORDER — ACETAMINOPHEN AND CODEINE PHOSPHATE 120; 12 MG/5ML; MG/5ML
1 SOLUTION ORAL DAILY
Qty: 90 TABLET | Refills: 4 | Status: SHIPPED | OUTPATIENT
Start: 2024-03-06

## 2024-03-06 ASSESSMENT — PAIN SCALES - GENERAL: PAINLEVEL: 0

## 2024-03-06 ASSESSMENT — EDINBURGH POSTNATAL DEPRESSION SCALE (EPDS)
THE THOUGHT OF HARMING MYSELF HAS OCCURRED TO ME: NEVER
I HAVE BEEN ABLE TO LAUGH AND SEE THE FUNNY SIDE OF THINGS: AS MUCH AS I ALWAYS COULD
THINGS HAVE BEEN GETTING ON TOP OF ME: NO, I HAVE BEEN COPING AS WELL AS EVER
I HAVE FELT SAD OR MISERABLE: NO, NOT AT ALL
I HAVE FELT SCARED OR PANICKY FOR NO GOOD REASON: NO, NOT AT ALL
I HAVE BEEN ANXIOUS OR WORRIED FOR NO GOOD REASON: NO, NOT AT ALL
TOTAL SCORE: 0
I HAVE LOOKED FORWARD WITH ENJOYMENT TO THINGS: AS MUCH AS I EVER DID
I HAVE BLAMED MYSELF UNNECESSARILY WHEN THINGS WENT WRONG: NO, NEVER
I HAVE BEEN SO UNHAPPY THAT I HAVE HAD DIFFICULTY SLEEPING: NOT AT ALL
I HAVE BEEN SO UNHAPPY THAT I HAVE BEEN CRYING: NO, NEVER

## 2024-06-11 ENCOUNTER — E-ADVICE (OUTPATIENT)
Dept: OTHER | Age: 44
End: 2024-06-11

## 2024-10-02 ENCOUNTER — APPOINTMENT (OUTPATIENT)
Dept: URBAN - METROPOLITAN AREA CLINIC 314 | Age: 44
Setting detail: DERMATOLOGY
End: 2024-10-02

## 2024-10-02 DIAGNOSIS — Z12.83 ENCOUNTER FOR SCREENING FOR MALIGNANT NEOPLASM OF SKIN: ICD-10-CM

## 2024-10-02 DIAGNOSIS — D22 MELANOCYTIC NEVI: ICD-10-CM

## 2024-10-02 DIAGNOSIS — L81.4 OTHER MELANIN HYPERPIGMENTATION: ICD-10-CM

## 2024-10-02 DIAGNOSIS — L82.1 OTHER SEBORRHEIC KERATOSIS: ICD-10-CM

## 2024-10-02 DIAGNOSIS — D18.0 HEMANGIOMA: ICD-10-CM

## 2024-10-02 PROBLEM — D18.01 HEMANGIOMA OF SKIN AND SUBCUTANEOUS TISSUE: Status: ACTIVE | Noted: 2024-10-02

## 2024-10-02 PROBLEM — D23.72 OTHER BENIGN NEOPLASM OF SKIN OF LEFT LOWER LIMB, INCLUDING HIP: Status: ACTIVE | Noted: 2024-10-02

## 2024-10-02 PROBLEM — D22.5 MELANOCYTIC NEVI OF TRUNK: Status: ACTIVE | Noted: 2024-10-02

## 2024-10-02 PROCEDURE — 99213 OFFICE O/P EST LOW 20 MIN: CPT

## 2024-10-02 PROCEDURE — OTHER COUNSELING: OTHER

## 2024-10-02 ASSESSMENT — LOCATION SIMPLE DESCRIPTION DERM
LOCATION SIMPLE: LEFT BREAST
LOCATION SIMPLE: UPPER BACK
LOCATION SIMPLE: CHEST
LOCATION SIMPLE: RIGHT UPPER BACK

## 2024-10-02 ASSESSMENT — LOCATION DETAILED DESCRIPTION DERM
LOCATION DETAILED: INFERIOR THORACIC SPINE
LOCATION DETAILED: RIGHT INFERIOR UPPER BACK
LOCATION DETAILED: LEFT MEDIAL BREAST 10-11:00 REGION
LOCATION DETAILED: MIDDLE STERNUM

## 2024-10-02 ASSESSMENT — LOCATION ZONE DERM: LOCATION ZONE: TRUNK

## 2025-05-02 ENCOUNTER — E-ADVICE (OUTPATIENT)
Dept: OBGYN | Age: 45
End: 2025-05-02

## 2025-05-02 DIAGNOSIS — Z30.011 ENCOUNTER FOR INITIAL PRESCRIPTION OF CONTRACEPTIVE PILLS: ICD-10-CM

## 2025-05-02 RX ORDER — ACETAMINOPHEN AND CODEINE PHOSPHATE 120; 12 MG/5ML; MG/5ML
1 SOLUTION ORAL DAILY
Qty: 90 TABLET | Refills: 0 | Status: SHIPPED | OUTPATIENT
Start: 2025-05-02

## 2025-06-16 SDOH — ECONOMIC STABILITY: HOUSING INSECURITY: DO YOU HAVE PROBLEMS WITH ANY OF THE FOLLOWING?: NONE OF THE ABOVE

## 2025-06-16 SDOH — ECONOMIC STABILITY: FOOD INSECURITY: WITHIN THE PAST 12 MONTHS, THE FOOD YOU BOUGHT JUST DIDN'T LAST AND YOU DIDN'T HAVE MONEY TO GET MORE.: NEVER TRUE

## 2025-06-16 SDOH — ECONOMIC STABILITY: HOUSING INSECURITY: WHAT IS YOUR LIVING SITUATION TODAY?: I HAVE A STEADY PLACE TO LIVE

## 2025-06-16 ASSESSMENT — SOCIAL DETERMINANTS OF HEALTH (SDOH): IN THE PAST 12 MONTHS, HAS THE ELECTRIC, GAS, OIL, OR WATER COMPANY THREATENED TO SHUT OFF SERVICE IN YOUR HOME?: NO

## 2025-06-23 ENCOUNTER — APPOINTMENT (OUTPATIENT)
Dept: OBGYN | Age: 45
End: 2025-06-23

## 2025-06-23 VITALS
WEIGHT: 198.4 LBS | TEMPERATURE: 96.3 F | SYSTOLIC BLOOD PRESSURE: 135 MMHG | BODY MASS INDEX: 33.87 KG/M2 | HEART RATE: 78 BPM | DIASTOLIC BLOOD PRESSURE: 89 MMHG | HEIGHT: 64 IN

## 2025-06-23 DIAGNOSIS — R92.30 DENSE BREAST: ICD-10-CM

## 2025-06-23 DIAGNOSIS — Z30.011 ENCOUNTER FOR INITIAL PRESCRIPTION OF CONTRACEPTIVE PILLS: ICD-10-CM

## 2025-06-23 DIAGNOSIS — Z12.31 ENCOUNTER FOR SCREENING MAMMOGRAM FOR MALIGNANT NEOPLASM OF BREAST: Primary | ICD-10-CM

## 2025-06-23 PROCEDURE — 3079F DIAST BP 80-89 MM HG: CPT | Performed by: OBSTETRICS & GYNECOLOGY

## 2025-06-23 PROCEDURE — 99396 PREV VISIT EST AGE 40-64: CPT | Performed by: OBSTETRICS & GYNECOLOGY

## 2025-06-23 PROCEDURE — 3075F SYST BP GE 130 - 139MM HG: CPT | Performed by: OBSTETRICS & GYNECOLOGY

## 2025-06-23 RX ORDER — ACETAMINOPHEN AND CODEINE PHOSPHATE 120; 12 MG/5ML; MG/5ML
1 SOLUTION ORAL DAILY
Qty: 90 TABLET | Refills: 3 | Status: SHIPPED | OUTPATIENT
Start: 2025-06-23

## 2025-06-23 ASSESSMENT — PAIN SCALES - GENERAL: PAINLEVEL_OUTOF10: 0

## 2025-08-20 ENCOUNTER — APPOINTMENT (OUTPATIENT)
Dept: MAMMOGRAPHY | Age: 45
End: 2025-08-20
Attending: OBSTETRICS & GYNECOLOGY

## 2025-08-20 ENCOUNTER — HOSPITAL ENCOUNTER (OUTPATIENT)
Dept: MAMMOGRAPHY | Age: 45
Discharge: HOME | End: 2025-08-20
Attending: OBSTETRICS & GYNECOLOGY

## 2025-08-20 DIAGNOSIS — Z12.31 ENCOUNTER FOR SCREENING MAMMOGRAM FOR MALIGNANT NEOPLASM OF BREAST: ICD-10-CM

## 2025-08-20 PROCEDURE — 77067 SCR MAMMO BI INCL CAD: CPT

## 2025-08-27 ENCOUNTER — HOSPITAL ENCOUNTER (OUTPATIENT)
Dept: MAMMOGRAPHY | Age: 45
Discharge: HOME | End: 2025-08-27
Attending: OBSTETRICS & GYNECOLOGY

## 2025-08-27 DIAGNOSIS — R92.30 DENSE BREAST: ICD-10-CM

## 2025-08-27 PROCEDURE — 76641 ULTRASOUND BREAST COMPLETE: CPT

## 2025-09-03 ENCOUNTER — APPOINTMENT (OUTPATIENT)
Dept: URBAN - METROPOLITAN AREA CLINIC 314 | Age: 45
Setting detail: DERMATOLOGY
End: 2025-09-03

## 2025-09-03 DIAGNOSIS — L82.1 OTHER SEBORRHEIC KERATOSIS: ICD-10-CM

## 2025-09-03 DIAGNOSIS — D22 MELANOCYTIC NEVI: ICD-10-CM

## 2025-09-03 DIAGNOSIS — Q826 OTHER SPECIFIED ANOMALIES OF SKIN: ICD-10-CM

## 2025-09-03 DIAGNOSIS — D18.0 HEMANGIOMA: ICD-10-CM

## 2025-09-03 DIAGNOSIS — Q828 OTHER SPECIFIED ANOMALIES OF SKIN: ICD-10-CM

## 2025-09-03 DIAGNOSIS — Z12.83 ENCOUNTER FOR SCREENING FOR MALIGNANT NEOPLASM OF SKIN: ICD-10-CM

## 2025-09-03 DIAGNOSIS — L81.4 OTHER MELANIN HYPERPIGMENTATION: ICD-10-CM

## 2025-09-03 DIAGNOSIS — Q819 OTHER SPECIFIED ANOMALIES OF SKIN: ICD-10-CM

## 2025-09-03 PROCEDURE — OTHER COUNSELING: OTHER

## 2025-09-03 PROCEDURE — OTHER EDUCATIONAL RESOURCES PROVIDED: OTHER

## 2025-09-03 PROCEDURE — OTHER MIPS QUALITY: OTHER

## 2025-09-03 ASSESSMENT — LOCATION DETAILED DESCRIPTION DERM
LOCATION DETAILED: RIGHT ANTERIOR SHOULDER
LOCATION DETAILED: LEFT POSTERIOR SHOULDER
LOCATION DETAILED: SUBXIPHOID
LOCATION DETAILED: LEFT PROXIMAL DORSAL FOREARM
LOCATION DETAILED: RIGHT PROXIMAL DORSAL FOREARM
LOCATION DETAILED: RIGHT MID-UPPER BACK
LOCATION DETAILED: LEFT ANTERIOR SHOULDER
LOCATION DETAILED: RIGHT INFERIOR MEDIAL UPPER BACK
LOCATION DETAILED: LEFT MEDIAL UPPER BACK
LOCATION DETAILED: RIGHT SUPERIOR UPPER BACK

## 2025-09-03 ASSESSMENT — LOCATION SIMPLE DESCRIPTION DERM
LOCATION SIMPLE: LEFT UPPER BACK
LOCATION SIMPLE: RIGHT SHOULDER
LOCATION SIMPLE: RIGHT UPPER BACK
LOCATION SIMPLE: RIGHT FOREARM
LOCATION SIMPLE: LEFT FOREARM
LOCATION SIMPLE: LEFT SHOULDER
LOCATION SIMPLE: ABDOMEN

## 2025-09-03 ASSESSMENT — LOCATION ZONE DERM
LOCATION ZONE: ARM
LOCATION ZONE: TRUNK

## (undated) DEVICE — KIT SYRINGE 3ML ABG LL TIP LN DRAW FLTRPRO DRY LIHEP DISP

## (undated) DEVICE — SOLUTION PREP 4OZ 4% CHG LIQUID ANTIMICROBIAL FLPTP BTL

## (undated) DEVICE — NEEDLE HPO 21GA 1IN THNWL REG BVL LL SHLD MECH DEHP-FR STRL

## (undated) DEVICE — SYRINGE 60CC CATHTP PEEL POUCH SCT TRNLU STRL BULB DISP

## (undated) DEVICE — SPONGE SURG 4X4IN CTN 16 PLY XRY DTCT STRL LF DISP

## (undated) DEVICE — SUTURE VICRYL 1 CT1 36IN BRAID COAT ABS VIOL J347H

## (undated) DEVICE — PEN SURGMRK DEVON SKIN DISP NONSMEAR REG TIP FLXB RULER

## (undated) DEVICE — PAD DRSG 8X3IN CRD POLY CTN ABS PERFORATE FILM LF STRL

## (undated) DEVICE — SUTURE VICRYL 2-0 CT1 36IN BRAID COAT ABS VIOL J345H

## (undated) DEVICE — PAD ABD 8X7.5IN CELLULOSE ABS NONWOVEN SEAL EDGE LF STRL

## (undated) DEVICE — SOLUTION IRR 1000ML 0.9% NACL PLASTIC POUR BTL ISTNC N-PYRG

## (undated) DEVICE — WASHCLOTH PRSNL CHG CLOTH APL ALC FREE RNS FREE 7.5X7.5IN LF

## (undated) DEVICE — GOWN SURG LG L3 NONREINFORCE SET IN SLV STRL LF DISP BLUE

## (undated) DEVICE — SUTURE COAT VICRYL 4-0 KS 27IN BRAID ABS UNDYED J662H

## (undated) DEVICE — TRAY SURG VAG LF

## (undated) DEVICE — WATER STRL 1000ML PLASTIC POUR BTL LF

## (undated) DEVICE — SUTURE MONOCRYL MTPS 4-0 PS2 18IN MONO ABS UNDYED

## (undated) DEVICE — 2% CHLORHEXIDINE SKIN PREP ORANGE 26ML

## (undated) DEVICE — SUTURE 3-0 CT1 27IN PLN MONO ABS YELLOWISH TAN 842H

## (undated) DEVICE — SPONGE GAUZE 4X4IN RAYON POLY 4 PLY ABS NONWOVEN NADH STRL

## (undated) DEVICE — Device

## (undated) DEVICE — HANDLE YANKAUER HI CAPACITY

## (undated) DEVICE — STRIP 4X.5IN STRSTRP IPHR POLY POR ADH REINFORCE

## (undated) DEVICE — SPONGE LAP 18X18IN STRL

## (undated) DEVICE — TRAY CATH SURESTEP LUBRI-SIL STLK 16FR 350ML FOLEY URMTR

## (undated) DEVICE — DRAPE FLUID WRMR TEMP CNTRL PORT STAND 44X44IN EQUIPMENT ORS

## (undated) DEVICE — ELECTRODE PT RTN C30- LB 9FT CORD NONIRRITATE NONSENSITIZE

## (undated) DEVICE — GLOVE SURG 6 PROTEXIS PI MIC LF CRM PF SMTH BEAD CUFF STRL

## (undated) DEVICE — PENCIL SMKEVC COAT PSHBTN LF

## (undated) DEVICE — BLADE SURG 10 INDIV FOIL WRAP STD SCPL HNDL STRL PRSNA + SS

## (undated) DEVICE — SLEEVE CMPR MED KN LGTH ADJ CRCMF BLDR NYL KENDALL SCD EXP